# Patient Record
Sex: FEMALE | Race: BLACK OR AFRICAN AMERICAN | NOT HISPANIC OR LATINO | Employment: UNEMPLOYED
[De-identification: names, ages, dates, MRNs, and addresses within clinical notes are randomized per-mention and may not be internally consistent; named-entity substitution may affect disease eponyms.]

---

## 2017-06-17 ENCOUNTER — HEALTH MAINTENANCE LETTER (OUTPATIENT)
Age: 22
End: 2017-06-17

## 2018-11-17 ENCOUNTER — HEALTH MAINTENANCE LETTER (OUTPATIENT)
Age: 23
End: 2018-11-17

## 2020-08-27 ENCOUNTER — OFFICE VISIT (OUTPATIENT)
Dept: URGENT CARE | Facility: URGENT CARE | Age: 25
End: 2020-08-27
Payer: COMMERCIAL

## 2020-08-27 VITALS
WEIGHT: 205 LBS | TEMPERATURE: 98 F | HEART RATE: 68 BPM | SYSTOLIC BLOOD PRESSURE: 141 MMHG | BODY MASS INDEX: 38.73 KG/M2 | OXYGEN SATURATION: 100 % | DIASTOLIC BLOOD PRESSURE: 88 MMHG

## 2020-08-27 DIAGNOSIS — N89.8 VAGINAL DISCHARGE: ICD-10-CM

## 2020-08-27 DIAGNOSIS — N89.8 VAGINAL ITCHING: ICD-10-CM

## 2020-08-27 DIAGNOSIS — R30.0 DYSURIA: ICD-10-CM

## 2020-08-27 DIAGNOSIS — B96.89 BACTERIAL VAGINITIS: Primary | ICD-10-CM

## 2020-08-27 DIAGNOSIS — N76.0 BACTERIAL VAGINITIS: Primary | ICD-10-CM

## 2020-08-27 LAB
ALBUMIN UR-MCNC: NEGATIVE MG/DL
APPEARANCE UR: CLEAR
BACTERIA #/AREA URNS HPF: ABNORMAL /HPF
BILIRUB UR QL STRIP: NEGATIVE
COLOR UR AUTO: YELLOW
GLUCOSE UR STRIP-MCNC: NEGATIVE MG/DL
HGB UR QL STRIP: NEGATIVE
KETONES UR STRIP-MCNC: NEGATIVE MG/DL
LEUKOCYTE ESTERASE UR QL STRIP: ABNORMAL
NITRATE UR QL: NEGATIVE
NON-SQ EPI CELLS #/AREA URNS LPF: ABNORMAL /LPF
PH UR STRIP: 7 PH (ref 5–7)
RBC #/AREA URNS AUTO: ABNORMAL /HPF
SOURCE: ABNORMAL
SP GR UR STRIP: 1.01 (ref 1–1.03)
SPECIMEN SOURCE: ABNORMAL
UROBILINOGEN UR STRIP-ACNC: 0.2 EU/DL (ref 0.2–1)
WBC #/AREA URNS AUTO: ABNORMAL /HPF
WET PREP SPEC: ABNORMAL

## 2020-08-27 PROCEDURE — 81001 URINALYSIS AUTO W/SCOPE: CPT | Performed by: NURSE PRACTITIONER

## 2020-08-27 PROCEDURE — 87210 SMEAR WET MOUNT SALINE/INK: CPT | Performed by: NURSE PRACTITIONER

## 2020-08-27 PROCEDURE — 99203 OFFICE O/P NEW LOW 30 MIN: CPT | Performed by: NURSE PRACTITIONER

## 2020-08-27 PROCEDURE — 87591 N.GONORRHOEAE DNA AMP PROB: CPT | Performed by: NURSE PRACTITIONER

## 2020-08-27 PROCEDURE — 87491 CHLMYD TRACH DNA AMP PROBE: CPT | Performed by: NURSE PRACTITIONER

## 2020-08-27 RX ORDER — METRONIDAZOLE 500 MG/1
500 TABLET ORAL 2 TIMES DAILY
Qty: 14 TABLET | Refills: 0 | Status: SHIPPED | OUTPATIENT
Start: 2020-08-27 | End: 2020-09-03

## 2020-08-27 ASSESSMENT — PAIN SCALES - GENERAL: PAINLEVEL: MODERATE PAIN (4)

## 2020-08-27 NOTE — PROGRESS NOTES
SUBJECTIVE:   Jeannine Oakley is a 24 year old female presenting with a chief complaint of vaginal itching and discharge with odor for 2 1/2 weeks.  She has had one new sexual partner and has had chlamydia in the past.    Onset of symptoms was 2 week(s) ago.  Course of illness is worsening.    Severity moderate  Previous Treatment none      Past Medical History:   Diagnosis Date     Murmur, heart     as an infant     Current Outpatient Medications   Medication Sig Dispense Refill     metroNIDAZOLE (FLAGYL) 500 MG tablet Take 1 tablet (500 mg) by mouth 2 times daily for 7 days 14 tablet 0     UNKNOWN TO PATIENT Birth control       Social History     Tobacco Use     Smoking status: Never Smoker     Smokeless tobacco: Never Used   Substance Use Topics     Alcohol use: No     Alcohol/week: 0.0 standard drinks     History reviewed. No pertinent family history.     ROS: 10 point ROS neg other than the symptoms noted above in the HPI.     OBJECTIVE  BP (!) 141/88   Pulse 68   Temp 98  F (36.7  C) (Tympanic)   Wt 93 kg (205 lb)   SpO2 100%   Breastfeeding No   BMI 38.73 kg/m        GENERAL APPEARANCE: healthy appearing, alert     EYES: PERRL, EOMI, sclera non-icteric     HENT: oral exam benign, mucus membranes intact, without ulcers or lesions     NECK: no adenopathy or asymmetry, thyroid normal to palpation     RESP: lungs clear to auscultation - no rales, rhonchi or wheezes     CV: regular rates and rhythm, no murmurs, rubs, or gallop     ABDOMEN:  soft, nontender, no HSM or masses and bowel sounds normal     : Normal adnexae, and uterus without masses, thick white discharge is present in the vagina and coming from the cervical os     MS: extremities normal- no gross deformities noted; normal muscle tone.     SKIN: no suspicious lesions or rashes     NEURO: Normal strength and tone, mentation intact and speech normal     PSYCH: normal thought process; no significant mood disturbance      Labs:  Results  for orders placed or performed in visit on 08/27/20 (from the past 24 hour(s))   *UA reflex to Microscopic and Culture ([facility] and [facility] (except [place] and [place])    Specimen: Midstream Urine   Result Value Ref Range    Color Urine Yellow     Appearance Urine Clear     Glucose Urine Negative NEG^Negative mg/dL    Bilirubin Urine Negative NEG^Negative    Ketones Urine Negative NEG^Negative mg/dL    Specific Gravity Urine 1.015 1.003 - 1.035    Blood Urine Negative NEG^Negative    pH Urine 7.0 5.0 - 7.0 pH    Protein Albumin Urine Negative NEG^Negative mg/dL    Urobilinogen Urine 0.2 0.2 - 1.0 EU/dL    Nitrite Urine Negative NEG^Negative    Leukocyte Esterase Urine Small (A) NEG^Negative    Source Midstream Urine    Urine Microscopic   Result Value Ref Range    WBC Urine 5-10 (A) OTO5^0 - 5 /HPF    RBC Urine O - 2 OTO2^O - 2 /HPF    Squamous Epithelial /LPF Urine Many (A) FEW^Few /LPF    Bacteria Urine Moderate (A) NEG^Negative /HPF   Wet prep    Specimen: Vagina   Result Value Ref Range    Specimen Description Vagina     Wet Prep No Trichomonas seen     Wet Prep Moderate  Clue cells seen   (A)     Wet Prep No yeast seen     Wet Prep Few  WBC'S seen            ASSESSMENT/PLAN:      ICD-10-CM    1. Bacterial vaginitis  N76.0 metroNIDAZOLE (FLAGYL) 500 MG tablet    B96.89    2. Dysuria  R30.0 Wet prep     *UA reflex to Microscopic and Culture ([facility] (except [place] and [place])     Urine Microscopic   3. Vaginal itching  N89.8 NEISSERIA GONORRHOEA PCR     CHLAMYDIA TRACHOMATIS PCR   4. Vaginal discharge  N89.8 NEISSERIA GONORRHOEA PCR     CHLAMYDIA TRACHOMATIS PCR        Follow Up:      Patient Instructions     Patient Education     Bacterial Vaginosis    You have a vaginal infection called bacterial vaginosis (BV). Both good and bad bacteria are present in a healthy vagina. BV occurs when these bacteria get out of balance. The number of bad bacteria increase. And the number of  good bacteria decrease. Although BV is associated with sexual activity, it is not a sexually transmitted disease.  BV may or may not cause symptoms. If symptoms do occur, they can include:    Thin, gray, milky-white, or sometimes green discharge    Unpleasant odor or  fishy  smell    Itching, burning, or pain in or around the vagina  It is not known what causes BV, but certain factors can make the problem more likely. This can include:    Douching    Having sex with a new partner    Having sex with more than one partner  BV will sometimes go away on its own. But treatment is usually recommended. This is because untreated BV can increase the risk of more serious health problems such as:    Pelvic inflammatory disease (PID)     delivery (giving birth to a baby early if you re pregnant)    HIV and certain other sexually transmitted diseases (STDs)    Infection after surgery on the reproductive organs  Home care  General care    BV is most often treated with medicines called antibiotics. These may be given as pills or as a vaginal cream. If antibiotics are prescribed, be sure to use them exactly as directed. Also, be sure to complete all of the medicine, even if your symptoms go away.    Don't douche or having sex during treatment.    If you have sex with a female partner, ask your healthcare provider if she should also be treated.  Prevention    Don't douche.    Don't have sex. If you do have sex, then take steps to lower your risk:  ? Use condoms when having sex.  ? Limit the number of sexual partners you have.  Follow-up care  Follow up with your healthcare provider, or as advised.  When to seek medical advice  Call your healthcare provider right away if:    You have a fever of 100.4 F (38 C) or higher, or as directed by your provider.    Your symptoms worsen, or they don t go away within a few days of starting treatment.    You have new pain in the lower belly or pelvic region.    You have side effects that  bother you or a reaction to the pills or cream you re prescribed.    You or any partners you have sex with have new symptoms, such as a rash, joint pain, or sores.  Date Last Reviewed: 10/1/2017    0112-4228 [employer]. [address] [place] [address]. All rights reserved. This information is not intended as a substitute for professional medical care. Always follow your healthcare professional's instructions.               NATALIA Myers, CNP  [facility] Provider

## 2020-08-27 NOTE — PATIENT INSTRUCTIONS

## 2020-08-27 NOTE — LETTER
August 28, 2020      Jeannine Chung Cele  [address] [place] [address]        Dear ,    We are writing to inform you of your recent test results.     Your test results fall within the expected ranges and results were negative for chlamydia and gonorrhea.    Please continue with current treatment plan. Enclosed is a copy of these results.    If you have any questions or concerns, please call the clinic at the number listed above.   Thank you for choosing [facility].        Sincerely,        Caity Raines, CNP      Resulted Orders   Wet prep   Result Value Ref Range    Specimen Description Vagina     Wet Prep No Trichomonas seen     Wet Prep Moderate  Clue cells seen   (A)     Wet Prep No yeast seen     Wet Prep Few  WBC'S seen      *UA reflex to Microscopic and Culture ([place] [facility] (except [place] and [place])   Result Value Ref Range    Color Urine Yellow     Appearance Urine Clear     Glucose Urine Negative NEG^Negative mg/dL    Bilirubin Urine Negative NEG^Negative    Ketones Urine Negative NEG^Negative mg/dL    Specific Gravity Urine 1.015 1.003 - 1.035    Blood Urine Negative NEG^Negative    pH Urine 7.0 5.0 - 7.0 pH    Protein Albumin Urine Negative NEG^Negative mg/dL    Urobilinogen Urine 0.2 0.2 - 1.0 EU/dL    Nitrite Urine Negative NEG^Negative    Leukocyte Esterase Urine Small (A) NEG^Negative    Source Midstream Urine    Urine Microscopic   Result Value Ref Range    WBC Urine 5-10 (A) OTO5^0 - 5 /HPF    RBC Urine O - 2 OTO2^O - 2 /HPF    Squamous Epithelial /LPF Urine Many (A) FEW^Few /LPF    Bacteria Urine Moderate (A) NEG^Negative /HPF   NEISSERIA GONORRHOEA PCR   Result Value Ref Range    Specimen Descrip Endocervical     N Gonorrhea PCR Negative NEG^Negative      Comment:      Negative for N. gonorrhoeae rRNA by transcription mediated amplification.  A negative result by transcription mediated amplification does not preclude   the presence of N.  gonorrhoeae infection because results are dependent on   proper and adequate collection, absence of inhibitors, and sufficient rRNA to   be detected.     CHLAMYDIA TRACHOMATIS PCR   Result Value Ref Range    Specimen Description Endocervical     Chlamydia Trachomatis PCR Negative NEG^Negative      Comment:      Negative for C. trachomatis rRNA by transcription mediated amplification.  A negative result by transcription mediated amplification does not preclude   the presence of C. trachomatis infection because results are dependent on   proper and adequate collection, absence of inhibitors, and sufficient rRNA to   be detected.

## 2020-08-28 LAB
C TRACH DNA SPEC QL NAA+PROBE: NEGATIVE
N GONORRHOEA DNA SPEC QL NAA+PROBE: NEGATIVE
SPECIMEN SOURCE: NORMAL
SPECIMEN SOURCE: NORMAL

## 2021-02-23 ENCOUNTER — ANCILLARY PROCEDURE (OUTPATIENT)
Dept: GENERAL RADIOLOGY | Facility: CLINIC | Age: 26
End: 2021-02-23
Attending: FAMILY MEDICINE
Payer: COMMERCIAL

## 2021-02-23 ENCOUNTER — OFFICE VISIT (OUTPATIENT)
Dept: ORTHOPEDICS | Facility: CLINIC | Age: 26
End: 2021-02-23
Payer: COMMERCIAL

## 2021-02-23 VITALS — WEIGHT: 205 LBS | HEIGHT: 60 IN | BODY MASS INDEX: 40.25 KG/M2

## 2021-02-23 DIAGNOSIS — M25.572 ACUTE LEFT ANKLE PAIN: ICD-10-CM

## 2021-02-23 DIAGNOSIS — S82.891A CLOSED AVULSION FRACTURE OF RIGHT ANKLE, INITIAL ENCOUNTER: ICD-10-CM

## 2021-02-23 DIAGNOSIS — M25.572 ACUTE LEFT ANKLE PAIN: Primary | ICD-10-CM

## 2021-02-23 DIAGNOSIS — E66.01 MORBID OBESITY (H): ICD-10-CM

## 2021-02-23 PROCEDURE — 99204 OFFICE O/P NEW MOD 45 MIN: CPT | Performed by: FAMILY MEDICINE

## 2021-02-23 PROCEDURE — 73610 X-RAY EXAM OF ANKLE: CPT | Mod: LT | Performed by: RADIOLOGY

## 2021-02-23 ASSESSMENT — MIFFLIN-ST. JEOR: SCORE: 1606.37

## 2021-02-23 NOTE — PROGRESS NOTES
SPORTS & ORTHOPEDIC WALK-IN VISIT 2/23/2021    Primary Care Physician:      2/23/21 - slipped on patch of ice at work. Mentions eversion [employer].  Felt a pop.  Has history of ankle sprains during basketball as a youth.    Most of the pain is located on lateral malleolus.     Has had a chance to ice, rest, take tylenol.    Reason for visit:     What part of your body is injured / painful?  left ankle    What caused the injury /pain? Slip at work    How long ago did your injury occur or pain begin? today    What are your most bothersome symptoms? Pain and Swelling    How would you characterize your symptom?  aching and sharp    What makes your symptoms better? Rest, Ice and Tylenol    What makes your symptoms worse? Standing, Walking and Movement    Have you been previously seen for this problem? No    Medical History:    Any recent changes to your medical history? No    Any new medication prescribed since last visit? No    Have you had surgery on this body part before? 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 No    Do you have any vision problems? No    Do you have any chest pain or edema? No    Do you have any shortness of breath or wheezing?  No    Do you have stomach problems? No    Do you have any numbness or focal weakness? No    Do you have diabetes? No    Do you have problems with bleeding or clotting? No    Do you have an rashes or other skin lesions? No

## 2021-02-23 NOTE — LETTER
February 23, 2021      Jeannine Oakley  [address]  [place] [address]        To Whom It May Concern:    Jeannine Oakley was seen in our clinic. She suffered an injury to her ankle on site of employment.  She will be off of work until 3/1/21. Starting on 3/1/21 she will be limited to light duty activity. No frequent standing and walking.  Sitting tasks only.  Please make necessary accommodations which will continue until follow up in 4 weeks. 3/23/21.  Thanks.    Sincerely,        Kendall Cosby,

## 2021-02-23 NOTE — LETTER
2/23/2021         RE: Jeannine Oakley  [address]  [place] [address]        Dear Colleague,    Thank you for referring your patient, Jeannine Oakley, to the [facility] [place]. Please see a copy of my visit note below.          SPORTS & ORTHOPEDIC WALK-IN VISIT 2/23/2021    Primary Care Physician:      2/23/21 - slipped on patch of ice at work. Mentions [employer].  Felt a pop.  Has history of ankle sprains during basketball as a youth.    Most of the pain is located on lateral malleolus.     Has had a chance to ice, rest, take tylenol.    Reason for visit:     What part of your body is injured / painful?  left ankle    What caused the injury /pain? Slip at work    How long ago did your injury occur or pain begin? today    What are your most bothersome symptoms? Pain and Swelling    How would you characterize your symptom?  aching and sharp    What makes your symptoms better? Rest, Ice and Tylenol    What makes your symptoms worse? Standing, Walking and Movement    Have you been previously seen for this problem? No    Medical History:    Any recent changes to your medical history? No    Any new medication prescribed since last visit? No    Have you had surgery on this body part before? 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 No    Do you have any vision problems? No    Do you have any chest pain or edema? No    Do you have any shortness of breath or wheezing?  No    Do you have stomach problems? No    Do you have any numbness or focal weakness? No    Do you have diabetes? No    Do you have problems with bleeding or clotting? No    Do you have an rashes or other skin lesions? No           CHIEF COMPLAINT:  Pain of the Right Ankle       HISTORY OF PRESENT ILLNESS  Ms. Oakley is a pleasant 25 year old year old female  "who presents to clinic today with right ankle pain. Jeannine explains that right ankle pain began acutely this morning.  She slipped on a patch of ice while at work, inverting her ankle. Felt a \"pop\".      What part of your body is injured / painful?  left ankle    What caused the injury /pain? Slip at work    How long ago did your injury occur or pain begin? today    What are your most bothersome symptoms? Pain and Swelling    How would you characterize your symptom?  aching and sharp    What makes your symptoms better? Rest, Ice and Tylenol    What makes your symptoms worse? Standing, Walking and Movement    Have you been previously seen for this problem? No    Additional history: as documented    MEDICAL HISTORY  There is no problem list on file for this patient.      Current Outpatient Medications   Medication Sig Dispense Refill     UNKNOWN TO PATIENT Birth control         No Known Allergies    No family history on file.    Additional medical/Social/Surgical histories reviewed in [employer] and updated as appropriate.     REVIEW OF SYSTEMS (2/23/2021)  A 10-point review of systems was obtained and is negative except for as noted in the HPI.      PHYSICAL EXAM  Ht 1.524 m (5')   Wt 93 kg (205 lb)   BMI 40.04 kg/m      General  - normal appearance, in no obvious distress  HEENT  - conjunctivae not injected, moist mucous membranes  CV  - normal pulses at posterior tib and dorsalis pedis  Pulm  - normal respiratory pattern, non-labored  Musculoskeletal - ankle  - stance: gait favors affected side, reluctant to bear weight  - inspection: moderate swelling laterally, normal bone and joint alignment, no obvious deformity  - palpation: tenderness over ATFL TIP OF LATERAL MALLEOLUS, no tenderness over medial malleoli, navicular, or base of 5th MT  - ROM: intact globally but limited secondary to pain  - strength: 4/5 in all planes  - special tests:  pain with inversion stress  (-) anterior drawer  (-) talar tilt  (-) " Tinel's  (-) squeeze test  (-) Galvan test  Neuro  - no sensory or motor deficit, grossly normal coordination, normal muscle tone  Skin  - ecchymosis overlying lateral foot-ankle junction, no warmth or induration, no obvious rash  Psych  - interactive, appropriate, normal mood and affect    IMAGING :XR ankle left 3V. Final results and radiologist's interpretation, available in the [facility] health record. Images were reviewed with the patient/family members in the office today. My personal interpretation of the performed imaging is avulsion of lateral malleolus.     ASSESSMENT & PLAN  Ms. Oakley is a 25 year old year old female who presents to clinic today with acute pain and swelling of left ankle after inversion injury today. Discussed small avulsion fracture nature and that it will heal much like an ankle sprain over time.    Diagnosis:   Avulsion fracture of lateral malleolus.    Discussed wearing a CAM boot tall  Crutches and may WBAT  Elevation, ibuprofen/tylenol reviewed  Work note provided  Follow up 4 weeks    It was a pleasure seeing Jeannine today.    Kendall Cosby DO, CAQSM  Primary Care Sports Medicine

## 2021-02-23 NOTE — PROGRESS NOTES
"CHIEF COMPLAINT:  Pain of the Right Ankle       HISTORY OF PRESENT ILLNESS  Ms. Oakley is a pleasant 25 year old year old female who presents to clinic today with right ankle pain. Jeannine explains that right ankle pain began acutely this morning.  She slipped on a patch of ice while at work, inverting her ankle. Felt a \"pop\".      What part of your body is injured / painful?  left ankle    What caused the injury /pain? Slip at work    How long ago did your injury occur or pain begin? today    What are your most bothersome symptoms? Pain and Swelling    How would you characterize your symptom?  aching and sharp    What makes your symptoms better? Rest, Ice and Tylenol    What makes your symptoms worse? Standing, Walking and Movement    Have you been previously seen for this problem? No    Additional history: as documented    MEDICAL HISTORY  There is no problem list on file for this patient.      Current Outpatient Medications   Medication Sig Dispense Refill     UNKNOWN TO PATIENT Birth control         No Known Allergies    No family history on file.    Additional medical/Social/Surgical histories reviewed in [employer] and updated as appropriate.     REVIEW OF SYSTEMS (2/23/2021)  A 10-point review of systems was obtained and is negative except for as noted in the HPI.      PHYSICAL EXAM  Ht 1.524 m (5')   Wt 93 kg (205 lb)   BMI 40.04 kg/m      General  - normal appearance, in no obvious distress  HEENT  - conjunctivae not injected, moist mucous membranes  CV  - normal pulses at posterior tib and dorsalis pedis  Pulm  - normal respiratory pattern, non-labored  Musculoskeletal - ankle  - stance: gait favors affected side, reluctant to bear weight  - inspection: moderate swelling laterally, normal bone and joint alignment, no obvious deformity  - palpation: tenderness over ATFL TIP OF LATERAL MALLEOLUS, no tenderness over medial malleoli, navicular, or base of 5th MT  - ROM: intact globally but limited secondary to " pain  - strength: 4/5 in all planes  - special tests:  pain with inversion stress  (-) anterior drawer  (-) talar tilt  (-) Tinel's  (-) squeeze test  (-) Galvan test  Neuro  - no sensory or motor deficit, grossly normal coordination, normal muscle tone  Skin  - ecchymosis overlying lateral foot-ankle junction, no warmth or induration, no obvious rash  Psych  - interactive, appropriate, normal mood and affect    IMAGING :XR ankle left 3V. Final results and radiologist's interpretation, available in the [facility] health record. Images were reviewed with the patient/family members in the office today. My personal interpretation of the performed imaging is avulsion of lateral malleolus.     ASSESSMENT & PLAN  Ms. Oakley is a 25 year old year old female who presents to clinic today with acute pain and swelling of left ankle after inversion injury today. Discussed small avulsion fracture nature and that it will heal much like an ankle sprain over time.    Diagnosis:   Avulsion fracture of lateral malleolus.    Discussed wearing a CAM boot tall  Crutches and may WBAT  Elevation, ibuprofen/tylenol reviewed  Work note provided  Follow up 4 weeks    It was a pleasure seeing Jeannine today.    Kendall Cosby DO, CAQSM  Primary Care Sports Medicine

## 2021-03-27 ENCOUNTER — OFFICE VISIT (OUTPATIENT)
Dept: ORTHOPEDICS | Facility: CLINIC | Age: 26
End: 2021-03-27
Payer: COMMERCIAL

## 2021-03-27 VITALS — BODY MASS INDEX: 40.25 KG/M2 | HEIGHT: 60 IN | WEIGHT: 205 LBS

## 2021-03-27 DIAGNOSIS — M25.572 ACUTE LEFT ANKLE PAIN: ICD-10-CM

## 2021-03-27 DIAGNOSIS — S82.891D CLOSED AVULSION FRACTURE OF RIGHT ANKLE WITH ROUTINE HEALING, SUBSEQUENT ENCOUNTER: Primary | ICD-10-CM

## 2021-03-27 PROCEDURE — 99213 OFFICE O/P EST LOW 20 MIN: CPT | Performed by: FAMILY MEDICINE

## 2021-03-27 ASSESSMENT — MIFFLIN-ST. JEOR: SCORE: 1606.37

## 2021-03-27 NOTE — LETTER
3/27/2021         RE: Jeannine Oakley  [address]  [place] [address]        Dear Colleague,    Thank you for referring your patient, Jeannine Oakley, to the [facility]OP[facility] [place]. Please see a copy of my visit note below.    ESTABLISHED PATIENT FOLLOW-UP:  RECHECK (left ankle f/u)       HISTORY OF PRESENT ILLNESS  Ms. Oakley is a pleasant 25 year old year old female who presents to clinic today for follow-up of left ankle injury suffered on 2/23/2021    Date last seen: 2/23/2021  Following Therapeutic Plan: continuing with boot, decreased in swelling and pain. Pain: 4/10 with palpation  Function: Able to walk around the house ok but stairs are tough with or without the foot. Range of motion still feels limited but feels ankle is getting stronger.    Review of Systems:    Do you have fever, chills, weight loss? No    Do you have any vision problems? No    Do you have any chest pain or edema? No    Do you have any shortness of breath or wheezing?  No    Do you have stomach problems? No    Do you have any numbness or focal weakness? No    Do you have diabetes? No    Do you have problems with bleeding or clotting? No    Do you have an rashes or other skin lesions? No    MEDICAL HISTORY  Patient Active Problem List   Diagnosis     Morbid obesity (H)       Current Outpatient Medications   Medication Sig Dispense Refill     UNKNOWN TO PATIENT Birth control         No Known Allergies    No family history on file.    Additional medical/Social/Surgical histories reviewed in [facility] and updated as appropriate.       PHYSICAL EXAM  Ht 1.524 m (5')   Wt 93 kg (205 lb)   BMI 40.04 kg/m        General  - normal appearance, in no obvious distress  Musculoskeletal - ankle left  - stance: Normal gait and stance in boot  - inspection: Minimal swelling laterally, normal bone and joint alignment, no obvious deformity  - palpation: Mild remaining tenderness over ATFL TIP  OF LATERAL MALLEOLUS, no tenderness over medial malleoli, navicular, or base of 5th MT  - ROM: intact globally but limited secondary to pain  - strength: 4/5 in all planes  - special tests:  pain with inversion stress  (-) anterior drawer  Neuro  - no sensory or motor deficit, grossly normal coordination, normal muscle tone  Skin  -Resolved ecchymosis overlying lateral foot-ankle junction, no warmth or induration, no obvious rash  Psych  - interactive, appropriate, normal mood and affect    ASSESSMENT & PLAN  Ms. Oakley is a 25 year old year old female who presents to clinic today with RECHECK (left ankle f/u)    Diagnosis:  Avulsion fracture of left ankle  Sprain of left ankle    Jeannine has made strides towards recovery wearing the boot over the last 4 weeks.  At this time would like to transition into a lace up ankle brace.  I will implement a home exercise program for initial stretching and transition to strengthening.  She can remove ankle brace daily to perform his activities and if her ankle feels stronger in roughly 4 weeks she can discontinue the brace.  Follow-up as needed if strengthening and or range of motion remains painful in the next month.    It was a pleasure seeing Jeannine.    Kendall Cosby DO, CAM  Primary Care Sports Medicine

## 2021-03-27 NOTE — PROGRESS NOTES
ESTABLISHED PATIENT FOLLOW-UP:  RECHECK (left ankle f/u)       HISTORY OF PRESENT ILLNESS  Ms. Oakley is a pleasant 25 year old year old female who presents to clinic today for follow-up of left ankle injury suffered on 2/23/2021    Date last seen: 2/23/2021  Following Therapeutic Plan: continuing with boot, decreased in swelling and pain. Pain: 4/10 with palpation  Function: Able to walk around the house ok but stairs are tough with or without the foot. Range of motion still feels limited but feels ankle is getting stronger.    Review of Systems:    Do you have fever, chills, weight loss? No    Do you have any vision problems? No    Do you have any chest pain or edema? No    Do you have any shortness of breath or wheezing?  No    Do you have stomach problems? No    Do you have any numbness or focal weakness? No    Do you have diabetes? No    Do you have problems with bleeding or clotting? No    Do you have an rashes or other skin lesions? No    MEDICAL HISTORY  Patient Active Problem List   Diagnosis     Morbid obesity (H)       Current Outpatient Medications   Medication Sig Dispense Refill     UNKNOWN TO PATIENT Birth control         No Known Allergies    No family history on file.    Additional medical/Social/Surgical histories reviewed in [facility] and updated as appropriate.       PHYSICAL EXAM  Ht 1.524 m (5')   Wt 93 kg (205 lb)   BMI 40.04 kg/m        General  - normal appearance, in no obvious distress  Musculoskeletal - ankle left  - stance: Normal gait and stance in boot  - inspection: Minimal swelling laterally, normal bone and joint alignment, no obvious deformity  - palpation: Mild remaining tenderness over ATFL TIP OF LATERAL MALLEOLUS, no tenderness over medial malleoli, navicular, or base of 5th MT  - ROM: intact globally but limited secondary to pain  - strength: 4/5 in all planes  - special tests:  pain with inversion stress  (-) anterior drawer  Neuro  - no sensory or motor deficit, grossly normal  coordination, normal muscle tone  Skin  -Resolved ecchymosis overlying lateral foot-ankle junction, no warmth or induration, no obvious rash  Psych  - interactive, appropriate, normal mood and affect    ASSESSMENT & PLAN  Ms. Oakley is a 25 year old year old female who presents to clinic today with RECHECK (left ankle f/u)    Diagnosis:  Avulsion fracture of left ankle  Sprain of left ankle    Jeannine has made strides towards recovery wearing the boot over the last 4 weeks.  At this time would like to transition into a lace up ankle brace.  I will implement a home exercise program for initial stretching and transition to strengthening.  She can remove ankle brace daily to perform his activities and if her ankle feels stronger in roughly 4 weeks she can discontinue the brace.  Follow-up as needed if strengthening and or range of motion remains painful in the next month.    It was a pleasure seeing Jeannine.    Kendall Cosby DO, CAQSM  Primary Care Sports Medicine

## 2021-06-08 ENCOUNTER — OFFICE VISIT (OUTPATIENT)
Dept: URGENT CARE | Facility: URGENT CARE | Age: 26
End: 2021-06-08
Payer: COMMERCIAL

## 2021-06-08 VITALS
DIASTOLIC BLOOD PRESSURE: 90 MMHG | OXYGEN SATURATION: 100 % | SYSTOLIC BLOOD PRESSURE: 133 MMHG | BODY MASS INDEX: 40.7 KG/M2 | HEART RATE: 109 BPM | WEIGHT: 208.4 LBS | TEMPERATURE: 98.6 F

## 2021-06-08 DIAGNOSIS — H66.001 ACUTE SUPPURATIVE OTITIS MEDIA OF RIGHT EAR WITHOUT SPONTANEOUS RUPTURE OF TYMPANIC MEMBRANE, RECURRENCE NOT SPECIFIED: ICD-10-CM

## 2021-06-08 DIAGNOSIS — H60.391 INFECTIVE OTITIS EXTERNA, RIGHT: Primary | ICD-10-CM

## 2021-06-08 PROCEDURE — 99213 OFFICE O/P EST LOW 20 MIN: CPT | Performed by: PHYSICIAN ASSISTANT

## 2021-06-08 RX ORDER — AMOXICILLIN 875 MG
875 TABLET ORAL 2 TIMES DAILY
Qty: 20 TABLET | Refills: 0 | Status: SHIPPED | OUTPATIENT
Start: 2021-06-08 | End: 2021-06-18

## 2021-06-08 RX ORDER — NEOMYCIN SULFATE, POLYMYXIN B SULFATE AND HYDROCORTISONE 10; 3.5; 1 MG/ML; MG/ML; [USP'U]/ML
SUSPENSION/ DROPS AURICULAR (OTIC)
Qty: 10 ML | Refills: 0 | Status: SHIPPED | OUTPATIENT
Start: 2021-06-08 | End: 2021-11-08

## 2021-06-08 NOTE — PROGRESS NOTES
Chief Complaint   Patient presents with     Ear Problem     Right side ear pain. Started yesterday.             Differential Diagnosis:  URI Adult/Peds:  Acute right otitis media, Acute left otitis media, Otitis externa and TMJ, strep          ASSESSMENT:     ICD-10-CM    1. Infective otitis externa, right  H60.391 amoxicillin (AMOXIL) 875 MG tablet     neomycin-polymyxin-hydrocortisone (CORTISPORIN) 3.5-71225-6 otic suspension   2. Acute suppurative otitis media of right ear without spontaneous rupture of tympanic membrane, recurrence not specified  H66.001 amoxicillin (AMOXIL) 875 MG tablet     neomycin-polymyxin-hydrocortisone (CORTISPORIN) 3.5-90674-7 otic suspension             PLAN:Both otitis externa and otitis media. Oral antibiotic and antibiotic ear drops prescribed.  I have discussed clinical findings with patient.  Side effects of medications discussed.  Symptomatic care is discussed.  I have discussed the possibility of  worsening symptoms and indication to RTC or go to the ER if they occur.  All questions are answered, patient indicates understanding of these issues and is in agreement with plan.   Patient care instructions are discussed/given at the end of visit.   Lots of rest and fluids.      Jacque Ferguson PA-C      SUBJECTIVE:  25-year-old female presents for right ear pain since yesterday.  Some mild drainage.  No swimming.  No sore throat.  No cough.  No nasal congestion.  No fever.      No Known Allergies    Past Medical History:   Diagnosis Date     Murmur, heart     as an infant       UNKNOWN TO PATIENT, Birth control    No current facility-administered medications on file prior to visit.       Social History     Tobacco Use     Smoking status: Never Smoker     Smokeless tobacco: Never Used   Substance Use Topics     Alcohol use: No     Alcohol/week: 0.0 standard drinks       ROS:  CONSTITUTIONAL: Negative for fatigue or fever.  ENT: As above.  RESP: Negative for shortness of breath    MUSCULOSKELETAL:  Negative for significant muscle or joint pains.  NEUROLOGIC: Negative for headaches.  SKIN: Negative for rash.  PSYCH: Normal mentation for age.    OBJECTIVE:  BP (!) 133/90   Pulse 109   Temp 98.6  F (37  C) (Tympanic)   Wt 94.5 kg (208 lb 6.4 oz)   SpO2 100%   BMI 40.70 kg/m    GENERAL APPEARANCE: Healthy, alert and no distress.  EYES:Conjunctiva/sclera clear.  EARS: Left TM is translucent.  Right tragal tenderness present.  Ear canal is erythematous and inflamed.  TM is red.  resent right TM no cerumen.   Ear canals w/o erythema.  TM's intact w/o erythema.    NECK: Supple, nontender, no lymphadenopathy.  RESP: Breathing comfortably.  NEURO: Awake, alert    SKIN: No rashes        Jacque Ferguson PA-C

## 2021-06-20 ENCOUNTER — OFFICE VISIT (OUTPATIENT)
Dept: URGENT CARE | Facility: URGENT CARE | Age: 26
End: 2021-06-20
Payer: COMMERCIAL

## 2021-06-20 VITALS
RESPIRATION RATE: 16 BRPM | HEART RATE: 92 BPM | TEMPERATURE: 97.9 F | SYSTOLIC BLOOD PRESSURE: 137 MMHG | OXYGEN SATURATION: 100 % | DIASTOLIC BLOOD PRESSURE: 100 MMHG | BODY MASS INDEX: 40.82 KG/M2 | WEIGHT: 209 LBS

## 2021-06-20 DIAGNOSIS — R35.0 URINARY FREQUENCY: ICD-10-CM

## 2021-06-20 DIAGNOSIS — B37.31 YEAST INFECTION OF THE VAGINA: Primary | ICD-10-CM

## 2021-06-20 DIAGNOSIS — N94.9 VAGINAL DISCOMFORT: ICD-10-CM

## 2021-06-20 LAB
ALBUMIN UR-MCNC: ABNORMAL MG/DL
APPEARANCE UR: ABNORMAL
BACTERIA #/AREA URNS HPF: ABNORMAL /HPF
BILIRUB UR QL STRIP: NEGATIVE
COLOR UR AUTO: YELLOW
GLUCOSE UR STRIP-MCNC: NEGATIVE MG/DL
HGB UR QL STRIP: ABNORMAL
KETONES UR STRIP-MCNC: NEGATIVE MG/DL
LEUKOCYTE ESTERASE UR QL STRIP: ABNORMAL
NITRATE UR QL: NEGATIVE
NON-SQ EPI CELLS #/AREA URNS LPF: ABNORMAL /LPF
PH UR STRIP: 7 PH (ref 5–7)
RBC #/AREA URNS AUTO: ABNORMAL /HPF
SOURCE: ABNORMAL
SP GR UR STRIP: 1.01 (ref 1–1.03)
SPECIMEN SOURCE: ABNORMAL
UROBILINOGEN UR STRIP-ACNC: 1 EU/DL (ref 0.2–1)
WBC #/AREA URNS AUTO: ABNORMAL /HPF
WET PREP SPEC: ABNORMAL
YEAST #/AREA URNS HPF: ABNORMAL /HPF

## 2021-06-20 PROCEDURE — 87086 URINE CULTURE/COLONY COUNT: CPT | Performed by: FAMILY MEDICINE

## 2021-06-20 PROCEDURE — 99213 OFFICE O/P EST LOW 20 MIN: CPT | Performed by: FAMILY MEDICINE

## 2021-06-20 PROCEDURE — 81001 URINALYSIS AUTO W/SCOPE: CPT | Performed by: FAMILY MEDICINE

## 2021-06-20 PROCEDURE — 87210 SMEAR WET MOUNT SALINE/INK: CPT | Performed by: FAMILY MEDICINE

## 2021-06-20 RX ORDER — TIMOLOL MALEATE 5 MG/ML
SOLUTION/ DROPS OPHTHALMIC
COMMUNITY
Start: 2021-01-31 | End: 2021-11-08

## 2021-06-20 RX ORDER — FLUCONAZOLE 150 MG/1
150 TABLET ORAL DAILY
Qty: 3 TABLET | Refills: 0 | Status: SHIPPED | OUTPATIENT
Start: 2021-06-20 | End: 2021-06-23

## 2021-06-20 ASSESSMENT — PAIN SCALES - GENERAL: PAINLEVEL: NO PAIN (0)

## 2021-06-20 NOTE — PATIENT INSTRUCTIONS
Patient Education     Vaginal Infection: Yeast (Candidiasis)  Yeast infection occurs when yeast in the vagina increase and attacks the vaginal tissues. Yeast is a type of fungus. These infections are often caused by a type of yeast called Candida albicans. Other species of yeast can also cause infections. Factors that may make infection more likely include recent antibiotic use, douching, or increased sex. Yeast infections are more common in women who have diabetes, or are obese or pregnant, or have a weak immune system.   Symptoms of yeast infection    Clumpy or thin, white discharge, which may look like cottage cheese    No odor or minimal odor    Severe vaginal itching or burning    Burning with urination    Swelling, redness of vulva    Pain during sex    Treating yeast infection  Yeast infection is treated with a vaginal antifungal cream. In some cases, antifungal pills are prescribed instead. During treatment:     Finish all of your medicine, even if your symptoms go away.    Apply the cream before going to bed. Lie flat after applying so that it doesn't drip out.    Don't douche or use tampons.    Don't rely on a diaphragm or condoms, since the cream may weaken them.    Avoid intercourse if advised by your healthcare provider.  Should I treat a yeast infection myself?  Discuss with your healthcare provider whether you should use over-the-counter medicines to treat a yeast infection. Self-treatment may depend on whether:     You've had a yeast infection in the past.    You're at risk for sexually transmitted infections (STIs).  Call your healthcare provider if symptoms don't go away or come back after treatment.   [employer] last reviewed this educational content on 4/1/2020 2000-2021 [employer]. All rights reserved. This information is not intended as a substitute for professional medical care. Always follow your healthcare professional's instructions.

## 2021-06-20 NOTE — PROGRESS NOTES
Assessment & Plan     Yeast infection of the vagina  Wet prep findings consistent with vaginal candidiasis.  Diflucan prescribed  - fluconazole (DIFLUCAN) 150 MG tablet; Take 1 tablet (150 mg) by mouth daily for 3 days    Urinary frequency  UA findings reviewed.  Patient denies any significant bladder symptoms.  Urine culture sent for further evaluation  - *UA reflex to Microscopic and Culture ([place] and [facility] (except [place] and [place])  - Urine Microscopic  - Urine Culture Aerobic Bacterial    Vaginal discomfort  - Wet prep      Patient Instructions     Patient Education     Vaginal Infection: Yeast (Candidiasis)  Yeast infection occurs when yeast in the vagina increase and attacks the vaginal tissues. Yeast is a type of fungus. These infections are often caused by a type of yeast called Candida albicans. Other species of yeast can also cause infections. Factors that may make infection more likely include recent antibiotic use, douching, or increased sex. Yeast infections are more common in women who have diabetes, or are obese or pregnant, or have a weak immune system.   Symptoms of yeast infection    Clumpy or thin, white discharge, which may look like cottage cheese    No odor or minimal odor    Severe vaginal itching or burning    Burning with urination    Swelling, redness of vulva    Pain during sex    Treating yeast infection  Yeast infection is treated with a vaginal antifungal cream. In some cases, antifungal pills are prescribed instead. During treatment:     Finish all of your medicine, even if your symptoms go away.    Apply the cream before going to bed. Lie flat after applying so that it doesn't drip out.    Don't douche or use tampons.    Don't rely on a diaphragm or condoms, since the cream may weaken them.    Avoid intercourse if advised by your healthcare provider.  Should I treat a yeast infection myself?  Discuss with your healthcare provider whether you should use  over-the-counter medicines to treat a yeast infection. Self-treatment may depend on whether:     You've had a yeast infection in the past.    You're at risk for sexually transmitted infections (STIs).  Call your healthcare provider if symptoms don't go away or come back after treatment.   [employer] last reviewed this educational content on 4/1/2020 2000-2021 [employer]. All rights reserved. This information is not intended as a substitute for professional medical care. Always follow your healthcare professional's instructions.               César Okeefe MD  [facility] JOSE Paez is a 25 year old who presents for the following health issues    HPI     Concern -   Onset: 3 days   Description: Patient states that her vagina has been itching since thursday night 6/17 and is getting worse. Patient states that she is on her last dose of amoxicillian for ear infection- not seen on medication list.  Intensity: moderate  Progression of Symptoms:  worsening  Accompanying Signs & Symptoms: no bladder symptoms   Therapies tried and outcome: yeast kit just for 1 day        Review of Systems   Constitutional, HEENT, cardiovascular, pulmonary, gi and gu systems are negative, except as otherwise noted.      Objective    BP (!) 137/100   Pulse 92   Temp 97.9  F (36.6  C) (Tympanic)   Resp 16   Wt 94.8 kg (209 lb)   SpO2 100%   BMI 40.82 kg/m    Body mass index is 40.82 kg/m .  Physical Exam   GENERAL: alert and no distress  NECK: no adenopathy, no asymmetry, masses, or scars and thyroid normal to palpation  RESP: lungs clear to auscultation - no rales, rhonchi or wheezes  CV: regular rate and rhythm, normal S1 S2, no S3 or S4, no murmur, click or rub, no peripheral edema and peripheral pulses strong  ABDOMEN: soft, nontender, no hepatosplenomegaly, no masses and bowel sounds normal  MS: no gross musculoskeletal defects noted, no edema      Results for orders  placed or performed in visit on 06/20/21   *UA reflex to Microscopic and Culture ([place] and [facility] (except [place] and [place])     Status: Abnormal    Specimen: Midstream Urine   Result Value Ref Range    Color Urine Yellow     Appearance Urine Slightly Cloudy     Glucose Urine Negative NEG^Negative mg/dL    Bilirubin Urine Negative NEG^Negative    Ketones Urine Negative NEG^Negative mg/dL    Specific Gravity Urine 1.015 1.003 - 1.035    Blood Urine Moderate (A) NEG^Negative    pH Urine 7.0 5.0 - 7.0 pH    Protein Albumin Urine Trace (A) NEG^Negative mg/dL    Urobilinogen Urine 1.0 0.2 - 1.0 EU/dL    Nitrite Urine Negative NEG^Negative    Leukocyte Esterase Urine Moderate (A) NEG^Negative    Source Midstream Urine    Urine Microscopic     Status: Abnormal   Result Value Ref Range    WBC Urine 0 - 5 OTO5^0 - 5 /HPF    RBC Urine 2-5 (A) OTO2^O - 2 /HPF    Squamous Epithelial /LPF Urine Few FEW^Few /LPF    Bacteria Urine Many (A) NEG^Negative /HPF    Yeast Urine Moderate (A) NEG^Negative /HPF   Wet prep     Status: Abnormal    Specimen: Vagina   Result Value Ref Range    Specimen Description Vagina     Wet Prep No Trichomonas seen     Wet Prep No clue cells seen     Wet Prep Moderate  Yeast seen   (A)     Wet Prep Moderate  WBC'S seen

## 2021-06-21 LAB
BACTERIA SPEC CULT: NORMAL
Lab: NORMAL
SPECIMEN SOURCE: NORMAL

## 2021-07-10 ENCOUNTER — HEALTH MAINTENANCE LETTER (OUTPATIENT)
Age: 26
End: 2021-07-10

## 2021-09-04 ENCOUNTER — HEALTH MAINTENANCE LETTER (OUTPATIENT)
Age: 26
End: 2021-09-04

## 2021-11-08 ENCOUNTER — OFFICE VISIT (OUTPATIENT)
Dept: URGENT CARE | Facility: URGENT CARE | Age: 26
End: 2021-11-08
Payer: COMMERCIAL

## 2021-11-08 VITALS
BODY MASS INDEX: 39.33 KG/M2 | OXYGEN SATURATION: 98 % | DIASTOLIC BLOOD PRESSURE: 98 MMHG | WEIGHT: 201.4 LBS | HEART RATE: 81 BPM | TEMPERATURE: 98.1 F | SYSTOLIC BLOOD PRESSURE: 138 MMHG

## 2021-11-08 DIAGNOSIS — N89.8 VAGINAL ODOR: Primary | ICD-10-CM

## 2021-11-08 LAB
ALBUMIN UR-MCNC: NEGATIVE MG/DL
APPEARANCE UR: CLEAR
BACTERIA #/AREA URNS HPF: ABNORMAL /HPF
BILIRUB UR QL STRIP: NEGATIVE
CLUE CELLS: PRESENT
COLOR UR AUTO: YELLOW
GLUCOSE UR STRIP-MCNC: NEGATIVE MG/DL
HGB UR QL STRIP: ABNORMAL
KETONES UR STRIP-MCNC: NEGATIVE MG/DL
LEUKOCYTE ESTERASE UR QL STRIP: NEGATIVE
NITRATE UR QL: NEGATIVE
PH UR STRIP: 7 [PH] (ref 5–7)
RBC #/AREA URNS AUTO: ABNORMAL /HPF
SP GR UR STRIP: 1.01 (ref 1–1.03)
SQUAMOUS #/AREA URNS AUTO: ABNORMAL /LPF
TRICHOMONAS, WET PREP: ABNORMAL
UROBILINOGEN UR STRIP-ACNC: 1 E.U./DL
WBC #/AREA URNS AUTO: ABNORMAL /HPF
WBC'S/HIGH POWER FIELD, WET PREP: ABNORMAL
YEAST, WET PREP: ABNORMAL

## 2021-11-08 PROCEDURE — 87210 SMEAR WET MOUNT SALINE/INK: CPT | Performed by: NURSE PRACTITIONER

## 2021-11-08 PROCEDURE — 81001 URINALYSIS AUTO W/SCOPE: CPT | Performed by: NURSE PRACTITIONER

## 2021-11-08 PROCEDURE — 87491 CHLMYD TRACH DNA AMP PROBE: CPT | Performed by: NURSE PRACTITIONER

## 2021-11-08 PROCEDURE — 87591 N.GONORRHOEAE DNA AMP PROB: CPT | Performed by: NURSE PRACTITIONER

## 2021-11-08 PROCEDURE — 99214 OFFICE O/P EST MOD 30 MIN: CPT | Performed by: NURSE PRACTITIONER

## 2021-11-08 RX ORDER — METRONIDAZOLE 500 MG/1
500 TABLET ORAL 2 TIMES DAILY
Qty: 14 TABLET | Refills: 0 | Status: SHIPPED | OUTPATIENT
Start: 2021-11-08 | End: 2021-11-15

## 2021-11-09 NOTE — PATIENT INSTRUCTIONS
You have a bacterial infection called bacterial vaginosis/vagintis. This is not a sexually transmitted disease and your partner does not need to be treated.    Please take the medications as prescribed and do not have sexual intercourse until all the medication is gone.  Do not drink alcohol while taking this medications or you will become very sick.     This medication may interfere with birth control medications. While taking the antibiotic I would recommend using a second method of birth control.    Follow up if symptoms fail to improve or worsen.              Patient Education     Vaginal Infection: Bacterial Vaginosis  Both good and bad bacteria are present in a healthy vagina. Bacterial vaginosis (BV) occurs when these bacteria get out of balance. The numbers of good bacteria decrease. This allows the numbers of bad bacteria to increase and cause BV. In most cases, BV is not a serious problem.   Causes of bacterial vaginosis  The cause of BV is not clear. Douching may lead to it. Having sex with a new partner or more than 1 partner makes it more likely.   Symptoms of bacterial vaginosis  Symptoms of BV vary for each woman. Some women have few symptoms or none at all. If symptoms are present, they can include:     Thin, milky white or gray or sometimes green discharge    Unpleasant  fishy  odor    Irritation, itching, and burning at opening of vagina. This may mean it's caused by more than one type of bacteria.     Burning or irritation with sex or urination. This may mean it's caused by more than one type of bacteria.  Diagnosing bacterial vaginosis  Your healthcare provider will ask about your symptoms and health history. He or she will also do a pelvic exam. This is an exam of your vagina and cervix. A sample of vaginal fluid or discharge may be taken. This sample is checked for signs of BV.   Treating bacterial vaginosis  BV is often treated with antibiotics. They may be given in oral pill form or as a  vaginal cream. To use these medicines:     Be sure to take all of your medicine, even if your symptoms go away.    If you re taking antibiotic pills, don't drink alcohol until you re finished with all of your medicine.    If you re using vaginal cream, apply it as directed. Be aware that the cream may make condoms and diaphragms less effective.    Call your healthcare provider if symptoms dot go away within 4 days of starting treatment. Also call if you have a reaction to the medicine.  Why treatment matters  Even if you have no symptoms or your symptoms are mild, BV should be treated. Untreated BV can lead to health problems such as:     Increased risk for  delivery if you re pregnant    Increased risk for complications after surgery on the reproductive organs    Possible increased risk for pelvic inflammatory disease (PID)  [employer] last reviewed this educational content on 2020-2021 [employer]. All rights reserved. This information is not intended as a substitute for professional medical care. Always follow your healthcare professional's instructions.

## 2021-11-09 NOTE — PROGRESS NOTES
Chief Complaint   Patient presents with     Vaginal Problem         ICD-10-CM    1. Vaginal odor  N89.8 UA Macro with Reflex to Micro and Culture - lab collect     Wet prep - lab collect     UA Macro with Reflex to Micro and Culture - lab collect     Wet prep - lab collect     Chlamydia trachomatis/Neisseria gonorrhoeae by PCR - Clinic Collect     Urine Microscopic     metroNIDAZOLE (FLAGYL) 500 MG tablet   Patient will take metronidazole as prescribed.  Recommended no intercourse until treatment is complete and she has received results of gonorrhea and Chlamydia testing.    32 minutes total time spent reviewing patient's chart, completing history and physical exam, establishing plan of care, reviewing lab results and completing documentation.    Results for orders placed or performed in visit on 11/08/21 (from the past 24 hour(s))   UA Macro with Reflex to Micro and Culture - lab collect    Specimen: Urine, Midstream   Result Value Ref Range    Color Urine Yellow Colorless, Straw, Light Yellow, Yellow    Appearance Urine Clear Clear    Glucose Urine Negative Negative mg/dL    Bilirubin Urine Negative Negative    Ketones Urine Negative Negative mg/dL    Specific Gravity Urine 1.015 1.003 - 1.035    Blood Urine Trace (A) Negative    pH Urine 7.0 5.0 - 7.0    Protein Albumin Urine Negative Negative mg/dL    Urobilinogen Urine 1.0 0.2, 1.0 E.U./dL    Nitrite Urine Negative Negative    Leukocyte Esterase Urine Negative Negative   Wet prep - lab collect    Specimen: Vagina; Swab   Result Value Ref Range    Trichomonas Absent Absent    Yeast Absent Absent    Clue Cells Present (A) Absent    WBCs/high power field 1+ (A) None   Urine Microscopic   Result Value Ref Range    Bacteria Urine Moderate (A) None Seen /HPF    RBC Urine 0-2 0-2 /HPF /HPF    WBC Urine 0-5 0-5 /HPF /HPF    Squamous Epithelials Urine Moderate (A) None Seen /LPF    Narrative    Urine Culture not indicated       Subjective     Jeannine Oakley is  an 26 year old female who presents to clinic today for vaginal odor  And itching for over a week. She tried a 3 day Monistat kit with some relief of the odor, then she got her period and when she finished her period she noticed the smell again.    ROS: 10 point ROS neg other than the symptoms noted above in the HPI.       Objective    BP (!) 138/98   Pulse 81   Temp 98.1  F (36.7  C) (Tympanic)   Wt 91.4 kg (201 lb 6.4 oz)   SpO2 98%   Breastfeeding No   BMI 39.33 kg/m      Physical Exam       GENERAL APPEARANCE: healthy appearing, alert     ABDOMEN:  soft, nontender, no HSM or masses and bowel sounds normal, no CVA tenderness     MS: extremities normal- no gross deformities noted; normal muscle tone.     SKIN: no suspicious lesions or rashes     NEURO: Normal strength and tone, mentation intact and speech normal     PSYCH: normal thought process; no significant mood disturbance    Patient Instructions   You have a bacterial infection called bacterial vaginosis/vagintis. This is not a sexually transmitted disease and your partner does not need to be treated.    Please take the medications as prescribed and do not have sexual intercourse until all the medication is gone.  Do not drink alcohol while taking this medications or you will become very sick.     This medication may interfere with birth control medications. While taking the antibiotic I would recommend using a second method of birth control.    Follow up if symptoms fail to improve or worsen.              Patient Education     Vaginal Infection: Bacterial Vaginosis  Both good and bad bacteria are present in a healthy vagina. Bacterial vaginosis (BV) occurs when these bacteria get out of balance. The numbers of good bacteria decrease. This allows the numbers of bad bacteria to increase and cause BV. In most cases, BV is not a serious problem.   Causes of bacterial vaginosis  The cause of BV is not clear. Douching may lead to it. Having sex with a new  partner or more than 1 partner makes it more likely.   Symptoms of bacterial vaginosis  Symptoms of BV vary for each woman. Some women have few symptoms or none at all. If symptoms are present, they can include:     Thin, milky white or gray or sometimes green discharge    Unpleasant  fishy  odor    Irritation, itching, and burning at opening of vagina. This may mean it's caused by more than one type of bacteria.     Burning or irritation with sex or urination. This may mean it's caused by more than one type of bacteria.  Diagnosing bacterial vaginosis  Your healthcare provider will ask about your symptoms and health history. He or she will also do a pelvic exam. This is an exam of your vagina and cervix. A sample of vaginal fluid or discharge may be taken. This sample is checked for signs of BV.   Treating bacterial vaginosis  BV is often treated with antibiotics. They may be given in oral pill form or as a vaginal cream. To use these medicines:     Be sure to take all of your medicine, even if your symptoms go away.    If you re taking antibiotic pills, don't drink alcohol until you re finished with all of your medicine.    If you re using vaginal cream, apply it as directed. Be aware that the cream may make condoms and diaphragms less effective.    Call your healthcare provider if symptoms dot go away within 4 days of starting treatment. Also call if you have a reaction to the medicine.  Why treatment matters  Even if you have no symptoms or your symptoms are mild, BV should be treated. Untreated BV can lead to health problems such as:     Increased risk for  delivery if you re pregnant    Increased risk for complications after surgery on the reproductive organs    Possible increased risk for pelvic inflammatory disease (PID)  [employer] last reviewed this educational content on 2020-2021 [employer]. All rights reserved. This information is not intended as a substitute for  professional medical care. Always follow your healthcare professional's instructions.               NATALIA Myers, CNP  [facility] Provider

## 2021-11-10 LAB
C TRACH DNA SPEC QL PROBE+SIG AMP: NEGATIVE
N GONORRHOEA DNA SPEC QL NAA+PROBE: NEGATIVE

## 2022-08-06 ENCOUNTER — HEALTH MAINTENANCE LETTER (OUTPATIENT)
Age: 27
End: 2022-08-06

## 2022-10-22 ENCOUNTER — HEALTH MAINTENANCE LETTER (OUTPATIENT)
Age: 27
End: 2022-10-22

## 2023-01-06 ENCOUNTER — OFFICE VISIT (OUTPATIENT)
Dept: PEDIATRICS | Facility: CLINIC | Age: 28
End: 2023-01-06
Attending: PHYSICIAN ASSISTANT
Payer: COMMERCIAL

## 2023-01-06 ENCOUNTER — OFFICE VISIT (OUTPATIENT)
Dept: URGENT CARE | Facility: URGENT CARE | Age: 28
End: 2023-01-06
Payer: COMMERCIAL

## 2023-01-06 ENCOUNTER — ANCILLARY PROCEDURE (OUTPATIENT)
Dept: CT IMAGING | Facility: CLINIC | Age: 28
End: 2023-01-06
Attending: FAMILY MEDICINE
Payer: COMMERCIAL

## 2023-01-06 VITALS
SYSTOLIC BLOOD PRESSURE: 135 MMHG | TEMPERATURE: 98.3 F | HEART RATE: 80 BPM | WEIGHT: 221.4 LBS | BODY MASS INDEX: 43.24 KG/M2 | OXYGEN SATURATION: 99 % | DIASTOLIC BLOOD PRESSURE: 84 MMHG

## 2023-01-06 VITALS
TEMPERATURE: 98.8 F | DIASTOLIC BLOOD PRESSURE: 85 MMHG | SYSTOLIC BLOOD PRESSURE: 122 MMHG | HEART RATE: 72 BPM | OXYGEN SATURATION: 100 % | RESPIRATION RATE: 18 BRPM

## 2023-01-06 DIAGNOSIS — R10.33 ACUTE PERIUMBILICAL PAIN: Primary | ICD-10-CM

## 2023-01-06 DIAGNOSIS — R10.84 ABDOMINAL PAIN, GENERALIZED: ICD-10-CM

## 2023-01-06 DIAGNOSIS — R10.33 ACUTE PERIUMBILICAL PAIN: ICD-10-CM

## 2023-01-06 DIAGNOSIS — R93.5 ABNORMAL CT OF THE ABDOMEN: ICD-10-CM

## 2023-01-06 DIAGNOSIS — R10.84 ABDOMINAL PAIN, GENERALIZED: Primary | ICD-10-CM

## 2023-01-06 LAB
ALBUMIN SERPL-MCNC: 3.8 G/DL (ref 3.4–5)
ALBUMIN UR-MCNC: NEGATIVE MG/DL
ALP SERPL-CCNC: 109 U/L (ref 40–150)
ALT SERPL W P-5'-P-CCNC: 21 U/L (ref 0–50)
ANION GAP SERPL CALCULATED.3IONS-SCNC: 4 MMOL/L (ref 3–14)
APPEARANCE UR: CLEAR
AST SERPL W P-5'-P-CCNC: 14 U/L (ref 0–45)
BACTERIA #/AREA URNS HPF: ABNORMAL /HPF
BILIRUB SERPL-MCNC: 0.3 MG/DL (ref 0.2–1.3)
BILIRUB UR QL STRIP: NEGATIVE
BUN SERPL-MCNC: 10 MG/DL (ref 7–30)
CALCIUM SERPL-MCNC: 8.9 MG/DL (ref 8.5–10.1)
CHLORIDE BLD-SCNC: 105 MMOL/L (ref 94–109)
CO2 SERPL-SCNC: 29 MMOL/L (ref 20–32)
COLOR UR AUTO: ABNORMAL
CREAT SERPL-MCNC: 0.87 MG/DL (ref 0.52–1.04)
ERYTHROCYTE [DISTWIDTH] IN BLOOD BY AUTOMATED COUNT: 15.7 % (ref 10–15)
GFR SERPL CREATININE-BSD FRML MDRD: >90 ML/MIN/1.73M2
GLUCOSE BLD-MCNC: 93 MG/DL (ref 70–99)
GLUCOSE UR STRIP-MCNC: NEGATIVE MG/DL
HCG UR QL: NEGATIVE
HCT VFR BLD AUTO: 35.8 % (ref 35–47)
HGB BLD-MCNC: 11.2 G/DL (ref 11.7–15.7)
HGB UR QL STRIP: ABNORMAL
KETONES UR STRIP-MCNC: NEGATIVE MG/DL
LEUKOCYTE ESTERASE UR QL STRIP: NEGATIVE
LIPASE SERPL-CCNC: 173 U/L (ref 73–393)
MCH RBC QN AUTO: 25 PG (ref 26.5–33)
MCHC RBC AUTO-ENTMCNC: 31.3 G/DL (ref 31.5–36.5)
MCV RBC AUTO: 80 FL (ref 78–100)
NITRATE UR QL: NEGATIVE
PH UR STRIP: 6 [PH] (ref 5–7)
PLATELET # BLD AUTO: 270 10E3/UL (ref 150–450)
POTASSIUM BLD-SCNC: 3.9 MMOL/L (ref 3.4–5.3)
PROT SERPL-MCNC: 8.5 G/DL (ref 6.8–8.8)
RBC # BLD AUTO: 4.48 10E6/UL (ref 3.8–5.2)
RBC #/AREA URNS AUTO: ABNORMAL /HPF
SODIUM SERPL-SCNC: 138 MMOL/L (ref 133–144)
SP GR UR STRIP: 1.02 (ref 1–1.03)
SQUAMOUS #/AREA URNS AUTO: ABNORMAL /LPF
UROBILINOGEN UR STRIP-MCNC: NORMAL MG/DL
WBC # BLD AUTO: 9 10E3/UL (ref 4–11)
WBC #/AREA URNS AUTO: ABNORMAL /HPF

## 2023-01-06 PROCEDURE — 81025 URINE PREGNANCY TEST: CPT | Performed by: FAMILY MEDICINE

## 2023-01-06 PROCEDURE — 36415 COLL VENOUS BLD VENIPUNCTURE: CPT | Performed by: FAMILY MEDICINE

## 2023-01-06 PROCEDURE — 80053 COMPREHEN METABOLIC PANEL: CPT | Performed by: FAMILY MEDICINE

## 2023-01-06 PROCEDURE — 99215 OFFICE O/P EST HI 40 MIN: CPT | Performed by: FAMILY MEDICINE

## 2023-01-06 PROCEDURE — 83690 ASSAY OF LIPASE: CPT | Performed by: FAMILY MEDICINE

## 2023-01-06 PROCEDURE — 74177 CT ABD & PELVIS W/CONTRAST: CPT | Mod: GC | Performed by: STUDENT IN AN ORGANIZED HEALTH CARE EDUCATION/TRAINING PROGRAM

## 2023-01-06 PROCEDURE — 99207 REFERRAL TO ACUTE AND DIAGNOSTIC SERVICES: CPT | Performed by: PHYSICIAN ASSISTANT

## 2023-01-06 PROCEDURE — 85027 COMPLETE CBC AUTOMATED: CPT | Performed by: FAMILY MEDICINE

## 2023-01-06 PROCEDURE — 81001 URINALYSIS AUTO W/SCOPE: CPT | Performed by: FAMILY MEDICINE

## 2023-01-06 RX ORDER — IOPAMIDOL 755 MG/ML
135 INJECTION, SOLUTION INTRAVASCULAR ONCE
Status: COMPLETED | OUTPATIENT
Start: 2023-01-06 | End: 2023-01-06

## 2023-01-06 RX ORDER — AMLODIPINE BESYLATE 5 MG/1
5 TABLET ORAL
COMMUNITY
Start: 2021-06-24

## 2023-01-06 RX ADMIN — IOPAMIDOL 135 ML: 755 INJECTION, SOLUTION INTRAVASCULAR at 16:06

## 2023-01-06 ASSESSMENT — ENCOUNTER SYMPTOMS
MYALGIAS: 0
DIARRHEA: 0
VOMITING: 0
NAUSEA: 1
SORE THROAT: 0
RESPIRATORY NEGATIVE: 1
ABDOMINAL PAIN: 1
ARTHRALGIAS: 0
WOUND: 0
ALLERGIC/IMMUNOLOGIC NEGATIVE: 1
LIGHT-HEADEDNESS: 0
PALPITATIONS: 0
BACK PAIN: 0
RHINORRHEA: 0
SHORTNESS OF BREATH: 0
DYSURIA: 0
DIZZINESS: 0
JOINT SWELLING: 0
WEAKNESS: 0
HEADACHES: 0
MUSCULOSKELETAL NEGATIVE: 1
ENDOCRINE NEGATIVE: 1
FEVER: 0
EYES NEGATIVE: 1
CHILLS: 0
CARDIOVASCULAR NEGATIVE: 1
FREQUENCY: 0
NECK STIFFNESS: 0
COUGH: 0
NECK PAIN: 0

## 2023-01-06 ASSESSMENT — PAIN SCALES - GENERAL
PAINLEVEL: EXTREME PAIN (8)
PAINLEVEL: SEVERE PAIN (6)

## 2023-01-06 NOTE — PATIENT INSTRUCTIONS
Your CT shows a small hernia right at your bellybutton. It doesn't look like it is trapped but you do have a lot of pain so if the pain is getting worse again, I think going into the ER for re-examination makes sense over the weekend.     We also saw some 'fullness' in your ovaries - I don't think given where your pain is that this is the cause, but would like to have you come back on Monday to do an ultrasound of your ovaries which is a better test.     -Try taking ibuprofen and/or tylenol for the pain.       If you have any of the following over the weekend go into the ER:  -Fever >100.4  -Pain gets more severe and doesn't improve if you lay flat for a short time.   -Vomiting

## 2023-01-06 NOTE — PROGRESS NOTES
Chief Complaint:    Chief Complaint   Patient presents with     Abdominal Pain     Abdominal pain for two nights     Heartburn     Heartburn or indigestion. Burning sensation in chest. Onset- Wednesday night      Medical Decision Making:    Vital signs reviewed by Alverto Munoz PA-C  /84 (BP Location: Left arm, Patient Position: Sitting, Cuff Size: Adult Large)   Pulse 80   Temp 98.3  F (36.8  C) (Tympanic)   Wt 100.4 kg (221 lb 6.4 oz)   SpO2 99%   BMI 43.24 kg/m      Differential Diagnosis:  Abdominal Pain: Constipation, GB/Cholelithiasis, GERD/Ulcer, Appendix, Abdominal Wall, Hernia, Pancreatitis and Viral Gastroenteritis      ASSESSMENT:     1. Abdominal pain, generalized       PLAN:     Patient is in no acute distress. Vital signs are stable including afebrile. Patient reports two days of significant abdominal pain. Because of the location of pain, patients history of  and appendix being intact advised sending the patient for ADS evaluation to exclude differentials including appendicitis, hernia, pancreatitis. ADS staff notified and handoff completed with ADS provider.  Patient instructed to otherwise follow up with PCP in 1 week if symptoms have not improved.  Sooner if symptoms worsen.  Patient verbalized understanding and agreed with this plan.    Labs:     No results found for any visits on 23.    Current Meds:    Current Outpatient Medications:      amLODIPine (NORVASC) 5 MG tablet, Take 5 mg by mouth, Disp: , Rfl:     Allergies:  No Known Allergies    SUBJECTIVE    HPI: Jeannine Oakley is an 27 year old female with no signficant past medical history who presents for evaluation and treatment of intermittent periumbilical abdominal pain which started 2 night ago and has been worsening since then, peaking last night when she also had associated heartburn type symptoms. Endorses associated nausea. Reports the pain is always there slightly though at certain times gets  significantly worse. She rates the pain a 9/10. It is not affected by eating or drinking, bowel movements have been normal and do not seem to alter the pain. She has not tried anything at home for the pain. No fevers, chills, vomiting, significant chest pain or shortness of breath, appetite changes, urinary symptoms, vaginal symptoms, recent illness or other.     Denies recent travel or sickness exposure. Did not eating some marinated chicken a couple nights ago that she thought may have caused the pain but no one else who ate the chicken had this pain. She denies previous episodes of similar pain that has lasted this long. The patient did have a  3 years ago, no other surgeries. She still has her appendix. The patient no longer smokes, denies alcohol and drug use. LMP started 5 days ago and ended yesterday, reports no sexual activity since October. She has not tried anything for the pain.     ROS:      Review of Systems   Constitutional: Negative for chills and fever.   HENT: Negative for congestion, ear pain, rhinorrhea and sore throat.    Eyes: Negative.    Respiratory: Negative.  Negative for cough and shortness of breath.    Cardiovascular: Negative.  Negative for chest pain and palpitations.   Gastrointestinal: Positive for abdominal pain and nausea. Negative for diarrhea and vomiting.   Endocrine: Negative.    Genitourinary: Negative.  Negative for dysuria, frequency and vaginal discharge.   Musculoskeletal: Negative.  Negative for arthralgias, back pain, joint swelling, myalgias, neck pain and neck stiffness.   Skin: Negative.  Negative for rash and wound.   Allergic/Immunologic: Negative.  Negative for immunocompromised state.   Neurological: Negative for dizziness, weakness, light-headedness and headaches.        Family History   No family history on file.    Social History  Social History     Socioeconomic History     Marital status: Single     Spouse name: Not on file     Number of children: Not  on file     Years of education: Not on file     Highest education level: Not on file   Occupational History     Not on file   Tobacco Use     Smoking status: Never     Smokeless tobacco: Never   Substance and Sexual Activity     Alcohol use: No     Alcohol/week: 0.0 standard drinks     Drug use: No     Sexual activity: Yes     Partners: Male     Birth control/protection: Pill   Other Topics Concern     Parent/sibling w/ CABG, MI or angioplasty before 65F 55M? Not Asked   Social History Narrative     Not on file     Social Determinants of Health     Financial Resource Strain: Not on file   Food Insecurity: Not on file   Transportation Needs: Not on file   Physical Activity: Not on file   Stress: Not on file   Social Connections: Not on file   Intimate Partner Violence: Not on file   Housing Stability: Not on file        Surgical History:  No past surgical history on file.     Problem List:  Patient Active Problem List   Diagnosis     Morbid obesity (H)           OBJECTIVE:     Vital signs noted and reviewed by Alverto Munoz PA-C  /84 (BP Location: Left arm, Patient Position: Sitting, Cuff Size: Adult Large)   Pulse 80   Temp 98.3  F (36.8  C) (Tympanic)   Wt 100.4 kg (221 lb 6.4 oz)   SpO2 99%   BMI 43.24 kg/m       PEFR:    Physical Exam  Vitals and nursing note reviewed.   Constitutional:       General: She is not in acute distress.     Appearance: She is well-developed. She is not ill-appearing, toxic-appearing or diaphoretic.   HENT:      Head: Normocephalic and atraumatic.      Right Ear: Tympanic membrane and external ear normal. No drainage, swelling or tenderness. Tympanic membrane is not perforated, erythematous, retracted or bulging.      Left Ear: Tympanic membrane and external ear normal. No drainage, swelling or tenderness. Tympanic membrane is not perforated, erythematous, retracted or bulging.      Nose: No mucosal edema, congestion or rhinorrhea.      Right Sinus: No maxillary sinus  tenderness or frontal sinus tenderness.      Left Sinus: No maxillary sinus tenderness or frontal sinus tenderness.      Mouth/Throat:      Pharynx: No pharyngeal swelling, oropharyngeal exudate, posterior oropharyngeal erythema or uvula swelling.      Tonsils: No tonsillar abscesses.   Eyes:      Pupils: Pupils are equal, round, and reactive to light.   Neck:      Trachea: Trachea normal.   Cardiovascular:      Rate and Rhythm: Normal rate and regular rhythm.      Heart sounds: Normal heart sounds, S1 normal and S2 normal. No murmur heard.    No friction rub. No gallop.   Pulmonary:      Effort: Pulmonary effort is normal. No respiratory distress.      Breath sounds: Normal breath sounds. No decreased breath sounds, wheezing, rhonchi or rales.   Abdominal:      General: Bowel sounds are normal. There is no distension.      Palpations: Abdomen is soft. Abdomen is not rigid. There is no mass.      Tenderness: There is abdominal tenderness (periumbilical) in the right lower quadrant. There is no guarding or rebound. Negative signs include Gaines's sign, Rovsing's sign, McBurney's sign, psoas sign and obturator sign.      Hernia: No hernia is present.   Musculoskeletal:      Cervical back: Full passive range of motion without pain, normal range of motion and neck supple.   Lymphadenopathy:      Cervical: No cervical adenopathy.   Skin:     General: Skin is warm and dry.   Neurological:      Mental Status: She is alert and oriented to person, place, and time.      Cranial Nerves: No cranial nerve deficit.      Deep Tendon Reflexes: Reflexes are normal and symmetric.   Psychiatric:         Behavior: Behavior normal. Behavior is cooperative.         Thought Content: Thought content normal.         Judgment: Judgment normal.             Alverto Munoz PA-C  1/6/2023, 2:01 PM

## 2023-01-06 NOTE — PROGRESS NOTES
Assessment & Plan     Acute periumbilical pain and milder diffuse pain: Quite significant pain on palpation of the periumbilical region and a little throughout the lower abdomen. No fever, no elevated wbcs, normal CMP and lipase. Due to the significant pain, we did do a CT scan (after negative pregnancy test) with contrast and the results do show a small periumbilical hernia that is in the location of her pain and I do suspect may be causing the pain though it does not appear to be particularly inflamed on CT per the two residents that were available for interpretation. There is also some fullness in the ovaries, but her pain is not really in that area and ultrasound is not available and if this were a more concerning ovarian finding we would likely have some other findings on CT, so we will schedule her to have an ultrasound on Monday - I have ordered the ultrasound. Her pain went from a 9/10 to a 5/10 and she was able to eat here which were good signs, but I did discuss that if her pain continues to be severe or worsens or she has any fever/dizziness that she should not wait until Monday to be re-evaluated. She also has not tried any ibuprofen or tylenol and she will try that as well. If this is a more acutely developed hernia with recent wall tearing, she may benefit from ibuprofen and heat and time. Can address hernia if it continues to bother her.   - CT Abdomen Pelvis w Contrast; Future  - CBC with platelets; Future  - Comprehensive metabolic panel; Future  - UA with Microscopic reflex to Culture; Future  - HCG qualitative urine; Future  - Lipase; Future  - Lipase  - HCG qualitative urine  - UA with Microscopic reflex to Culture  - Comprehensive metabolic panel  - CBC with platelets  - Urine Microscopic  - sodium chloride (PF) 0.9% PF flush 3 mL    CT Abdomen:  RESIDENT PRELIMINARY INTERPRETATION  IMPRESSION:   1. Small fat-containing periumbilical hernia.  2. Normal appearance of the appendix.  3. Mildly  prominent appearance of the ovaries without gross  abnormality on CT could consider pelvic ultrasound if clinically  indicated       45 minutes spent on the date of the encounter doing chart review, review of test results, interpretation of tests, patient visit, documentation and discussion with other provider(s)      No follow-ups on file.    Ama Brito MD  [facility] HospitalCARMEN Paez is a 27 year old, presenting for the following health issues: abdominal pain  Abdominal Pain (Vicki-umbilical)      HPI Patient has had periumbilical abdominal pain for the past 2 days. She states last night it was at its worse and she was also experiencing heart burn with it but now the pain is really just around her bellybutton. She rates her pain 9/10.     She has had a , no other abdominal surgeries. Has her appendix and gallbladder.     Pain History:  When did you first notice your pain? - Acute Pain   Have you seen anyone else for your pain? Yes - Urgent Care  Where in your body do you have pain? Abdominal/Flank Pain  Onset/Duration: 2 days ago  Description:   Character: Sharp  Location: vicki-umbilical region  Radiation: None  Intensity: severe  Progression of Symptoms:  worsening  Accompanying Signs & Symptoms:  Fever/Chills: No  Gas/Bloating: YES  Nausea: YES  Vomitting: No  Diarrhea: No  Constipation: No  Dysuria or Hematuria: No  History:   Trauma: No  Previous similar pain: No  Previous tests done: none  Precipitating factors:   Does the pain change with:     Food: No    Bowel Movement: No    Urination: No   Other factors:  Yes - bumps/movements when driving caused worsening pain  Therapies tried and outcome: None  No LMP recorded. (Menstrual status: Birth Control).    Review of Systems   As above      Objective    BP (!) 141/95 (BP Location: Right arm, Patient Position: Sitting, Cuff Size: Adult Regular)   Pulse 84   Temp 98.8  F (37.1  C) (Oral)   Resp 18   SpO2  100%   There is no height or weight on file to calculate BMI.  Physical Exam   General: Uncomfortable appearing young woman but not in acute distress.  Abdomen: obese, mildly distended. She has quite substantial pain but without guarding on periumbilical palpation - no mass felt and no rebound tenderness. Milder pain in both right and left lower quadrants that radiates into the umbilicus. She is able to sit up/down without wincing and walk without difficulty.      Results for orders placed or performed in visit on 01/06/23   Lipase     Status: Normal   Result Value Ref Range    Lipase 173 73 - 393 U/L   HCG qualitative urine     Status: Normal   Result Value Ref Range    hCG Urine Qualitative Negative Negative   UA with Microscopic reflex to Culture     Status: Abnormal    Specimen: Urine, Midstream   Result Value Ref Range    Color Urine Light Yellow Colorless, Straw, Light Yellow, Yellow    Appearance Urine Clear Clear    Glucose Urine Negative Negative mg/dL    Bilirubin Urine Negative Negative    Ketones Urine Negative Negative mg/dL    Specific Gravity Urine 1.022 0.999 - 1.035    Blood Urine Trace (A) Negative    pH Urine 6.0 5.0 - 7.0    Protein Albumin Urine Negative Negative mg/dL    Nitrite Urine Negative Negative    Leukocyte Esterase Urine Negative Negative    Urobilinogen Urine Normal Normal, 2.0 mg/dL   Comprehensive metabolic panel     Status: Normal   Result Value Ref Range    Sodium 138 133 - 144 mmol/L    Potassium 3.9 3.4 - 5.3 mmol/L    Chloride 105 94 - 109 mmol/L    Carbon Dioxide (CO2) 29 20 - 32 mmol/L    Anion Gap 4 3 - 14 mmol/L    Urea Nitrogen 10 7 - 30 mg/dL    Creatinine 0.87 0.52 - 1.04 mg/dL    Calcium 8.9 8.5 - 10.1 mg/dL    Glucose 93 70 - 99 mg/dL    Alkaline Phosphatase 109 40 - 150 U/L    AST 14 0 - 45 U/L    ALT 21 0 - 50 U/L    Protein Total 8.5 6.8 - 8.8 g/dL    Albumin 3.8 3.4 - 5.0 g/dL    Bilirubin Total 0.3 0.2 - 1.3 mg/dL    GFR Estimate >90 >60 mL/min/1.73m2   CBC with  platelets     Status: Abnormal   Result Value Ref Range    WBC Count 9.0 4.0 - 11.0 10e3/uL    RBC Count 4.48 3.80 - 5.20 10e6/uL    Hemoglobin 11.2 (L) 11.7 - 15.7 g/dL    Hematocrit 35.8 35.0 - 47.0 %    MCV 80 78 - 100 fL    MCH 25.0 (L) 26.5 - 33.0 pg    MCHC 31.3 (L) 31.5 - 36.5 g/dL    RDW 15.7 (H) 10.0 - 15.0 %    Platelet Count 270 150 - 450 10e3/uL   Urine Microscopic     Status: Abnormal   Result Value Ref Range    Bacteria Urine Few (A) None Seen /HPF    RBC Urine 0-2 0-2 /HPF /HPF    WBC Urine None Seen 0-5 /HPF /HPF    Squamous Epithelials Urine Moderate (A) None Seen /LPF    Narrative    Urine Culture not indicated   Results for orders placed or performed in visit on 01/06/23   CT Abdomen Pelvis w Contrast     Status: None (Preliminary result)    Impression    RESIDENT PRELIMINARY INTERPRETATION  IMPRESSION:   1. Small fat-containing periumbilical hernia.  2. Normal appearance of the appendix.  3. Mildly prominent appearance of the ovaries without gross  abnormality on CT could consider pelvic ultrasound if clinically  indicated

## 2023-01-09 ENCOUNTER — OFFICE VISIT (OUTPATIENT)
Dept: PEDIATRICS | Facility: CLINIC | Age: 28
End: 2023-01-09
Payer: COMMERCIAL

## 2023-01-09 ENCOUNTER — ANCILLARY PROCEDURE (OUTPATIENT)
Dept: ULTRASOUND IMAGING | Facility: CLINIC | Age: 28
End: 2023-01-09
Attending: FAMILY MEDICINE
Payer: COMMERCIAL

## 2023-01-09 VITALS
RESPIRATION RATE: 18 BRPM | DIASTOLIC BLOOD PRESSURE: 88 MMHG | SYSTOLIC BLOOD PRESSURE: 126 MMHG | OXYGEN SATURATION: 98 % | HEART RATE: 84 BPM | TEMPERATURE: 97.3 F

## 2023-01-09 DIAGNOSIS — R93.5 ABNORMAL CT OF THE ABDOMEN: ICD-10-CM

## 2023-01-09 DIAGNOSIS — K42.9 UMBILICAL HERNIA WITHOUT OBSTRUCTION AND WITHOUT GANGRENE: ICD-10-CM

## 2023-01-09 DIAGNOSIS — E28.2 POLYCYSTIC OVARIES: Primary | ICD-10-CM

## 2023-01-09 PROCEDURE — 76856 US EXAM PELVIC COMPLETE: CPT | Performed by: RADIOLOGY

## 2023-01-09 PROCEDURE — 99214 OFFICE O/P EST MOD 30 MIN: CPT | Performed by: STUDENT IN AN ORGANIZED HEALTH CARE EDUCATION/TRAINING PROGRAM

## 2023-01-09 PROCEDURE — 76830 TRANSVAGINAL US NON-OB: CPT | Performed by: RADIOLOGY

## 2023-01-09 NOTE — PROGRESS NOTES
Assessment & Plan     Polycystic ovaries  Bulky ovaries seen on CT and US concerning for polycystic ovarian disease. Recommend follow up with OB/GYN for further testing and evaluation for polycystic ovarian syndrome.     Umbilical hernia without obstruction and without gangrene  This is a little tender on exam. We discussed that this is a fat-containing hernia and generally no treatment needed unless it becomes very bothersome and painful. Discussed weight loss and exercise as a way to help reduce tension on the hernia.   - Adult General Surg Referral; Future    Abnormal CT of the abdomen   - US Pelvic Complete with Transvaginal        30 minutes spent on the date of the encounter doing chart review, review of test results, patient visit and documentation            No follow-ups on file.    Whit Escobedo, NATAL[facility]    Maida Paez is a 27 year old, presenting for the following health issues:  Abdominal Pain      HPI Patient is here today to follow up from visit on 1/6/23 for abdominal pain. She had a CT that showed no acute concerns but did note bulky ovaries. They didn't have time for US on 1/6 so she was referred back for pelvic US today. The pain is in the periumbilical region and lower abdomen. She has a fat containing umbilical hernia. She has been treating her symptoms with Advil and she states it is only slightly effective.       Patient Active Problem List   Diagnosis     Morbid obesity (H)     BMI 40.0-44.9, adult (H)     Elevated blood pressure reading     Family history of hypertension     Pre-diabetes     Current Outpatient Medications   Medication     amLODIPine (NORVASC) 5 MG tablet     No current facility-administered medications for this visit.         Review of Systems   Constitutional, HEENT, cardiovascular, pulmonary, GI, , musculoskeletal, neuro, skin, endocrine and psych systems are negative, except as otherwise noted.      Objective    BP  126/88 (BP Location: Right arm, Patient Position: Chair, Cuff Size: Adult Large)   Pulse 84   Temp 97.3  F (36.3  C) (Oral)   Resp 18   SpO2 98%   There is no height or weight on file to calculate BMI.  Physical Exam   GENERAL: alert and no distress  EYES: Eyes grossly normal to inspection, PERRL and conjunctivae and sclerae normal  RESP: lungs clear to auscultation - no rales, rhonchi or wheezes  CV: regular rate and rhythm, normal S1 S2, no S3 or S4, no murmur, click or rub, no peripheral edema  ABDOMEN: tenderness RLQ, umbilical and lower midline  MS: no gross musculoskeletal defects noted, no edema  SKIN: no suspicious lesions or rashes  NEURO: Normal strength and tone, mentation intact and speech normal  PSYCH: mentation appears normal, affect normal/bright    Results for orders placed or performed in visit on 23   US Pelvic Complete with Transvaginal     Status: None    Narrative    EXAMINATION: US PELVIC TRANSABDOMINAL AND TRANSVAGINAL 2023 3:06  PM      CLINICAL HISTORY: History of  section. LMP 2022.  Abnormal CT of the abdomen    COMPARISON: CT 2023    PROCEDURE COMMENT: Both transabdominal and transvaginal imaging  performed of the pelvis with color Doppler.    FINDINGS:  The uterus measures  4.5 cm x 9.1 cm x 5.7 cm. No focal myometrial  mass.     The endometrial stripe measures 8 mm.     The right ovary measures 2.5 cm x 3.9 cm x 3.3 cm. The left ovary  measures 2.1 cm x 4.0 cm x 2.7 cm. Both ovaries have numerous  follicles.     There is no simple free fluid in the pelvis.  No adnexal mass.      Impression    IMPRESSION:  1. Normal pelvic ultrasound.    SAEID SAMPSON MD         SYSTEM ID:  [id number]

## 2023-01-10 NOTE — TELEPHONE ENCOUNTER
REFERRAL INFORMATION:    Referring Provider:  Whit Escobedo    Referring Clinic:  [facility]    Reason for Visit/Diagnosis: hernia       FUTURE VISIT INFORMATION:    Appointment Date: 1.16.23    Appointment Time: 12:30 PM      NOTES RECORD STATUS  DETAILS   OFFICE NOTE from Referring Provider Internal 1.9.23 Whit Escobedo, [facility]   OFFICE NOTE from Other Specialists Internal 1.6.23 Alexander [facility] DISCHARGE SUMMARY/ ED VISITS      OPERATIVE REPORT     ENDOSCOPY (EGD)      PERTINENT LABS Internal    PATHOLOGY REPORTS (RELATED)     IMAGING (CT, MRI, US, XR)  Internal 1.6.23 CT ab pelvis

## 2023-01-16 ENCOUNTER — PRE VISIT (OUTPATIENT)
Dept: SURGERY | Facility: CLINIC | Age: 28
End: 2023-01-16

## 2023-01-16 ENCOUNTER — OFFICE VISIT (OUTPATIENT)
Dept: SURGERY | Facility: CLINIC | Age: 28
End: 2023-01-16
Attending: STUDENT IN AN ORGANIZED HEALTH CARE EDUCATION/TRAINING PROGRAM
Payer: COMMERCIAL

## 2023-01-16 VITALS
HEART RATE: 77 BPM | BODY MASS INDEX: 44.84 KG/M2 | SYSTOLIC BLOOD PRESSURE: 146 MMHG | DIASTOLIC BLOOD PRESSURE: 92 MMHG | WEIGHT: 222.4 LBS | OXYGEN SATURATION: 99 % | HEIGHT: 59 IN

## 2023-01-16 DIAGNOSIS — K42.9 UMBILICAL HERNIA WITHOUT OBSTRUCTION AND WITHOUT GANGRENE: ICD-10-CM

## 2023-01-16 PROBLEM — R73.03 PRE-DIABETES: Status: ACTIVE | Noted: 2021-05-13

## 2023-01-16 PROBLEM — Z82.49 FAMILY HISTORY OF HYPERTENSION: Status: ACTIVE | Noted: 2021-05-17

## 2023-01-16 PROBLEM — R03.0 ELEVATED BLOOD PRESSURE READING: Status: ACTIVE | Noted: 2021-05-17

## 2023-01-16 PROCEDURE — 99203 OFFICE O/P NEW LOW 30 MIN: CPT | Performed by: SURGERY

## 2023-01-16 ASSESSMENT — ENCOUNTER SYMPTOMS
VOMITING: 0
HOARSE VOICE: 0
SORE THROAT: 0
SINUS PAIN: 0
TROUBLE SWALLOWING: 0
BLOOD IN STOOL: 0
HEARTBURN: 0
BOWEL INCONTINENCE: 0
JAUNDICE: 0
CONSTIPATION: 0
RECTAL PAIN: 0
BLOATING: 1
SINUS CONGESTION: 1
NAUSEA: 0
TASTE DISTURBANCE: 0
ABDOMINAL PAIN: 1
NECK MASS: 0
DIARRHEA: 0
SMELL DISTURBANCE: 0

## 2023-01-16 ASSESSMENT — PATIENT HEALTH QUESTIONNAIRE - PHQ9
10. IF YOU CHECKED OFF ANY PROBLEMS, HOW DIFFICULT HAVE THESE PROBLEMS MADE IT FOR YOU TO DO YOUR WORK, TAKE CARE OF THINGS AT HOME, OR GET ALONG WITH OTHER PEOPLE: NOT DIFFICULT AT ALL
SUM OF ALL RESPONSES TO PHQ QUESTIONS 1-9: 8
SUM OF ALL RESPONSES TO PHQ QUESTIONS 1-9: 8

## 2023-01-16 ASSESSMENT — PAIN SCALES - GENERAL: PAINLEVEL: MODERATE PAIN (4)

## 2023-01-16 NOTE — PROGRESS NOTES
"Jeannine Oakley is a 27 year old female with a 1 month history of abdominal pain that is improved. During work up found to have small umbilical hernia. Patient does not feel mass.     Onset did not occur with lifting.  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    Past Medical History:   Diagnosis Date     Murmur, heart     as an infant     No past surgical history on file.    ROS: 10 point review of systems negative except noted in HPI  PHYSICAL EXAM  General appearance- healthy, alert, and in no distress.  Skin- Skin color and turgor normal.  No obvious rashes.  Neck- Neck is supple without obvious adenopathy.  Lungs- Respiratory effort unlabored.  Gait- Normal.  BMI 45  Abdomen - soft non distended, non tender without obvious masses. Umbilical hernia not appreciated.    CT done 1/6/23 reviewed today, very small umbilical hernia.    Impression: Small umbilical hernia in patient with BMI 45.  Risks of surgery far out weight benefits.  Recommend: to PCP for weight management referral, goal 20% weight loss to 178 lbs.  Needs to maintain for 6 months at goal weight.  (Weight loss may alleviate pain symptoms making surgery optional given small size.)  \"Total time = 30 minutes, spent on the date of encounter doing chart review, history and physical, documentation, patient education, and any further activity as noted above.           "

## 2023-01-16 NOTE — LETTER
"1/16/2023       RE: Jeannine Oakley  [address]  [place] [address]     Dear Colleague,    Thank you for referring your patient, Jeannine Oakley, to the [facility] [place] [facility]. Please see a copy of my visit note below.    Jeannine Oakley is a 27 year old female with a 1 month history of abdominal pain that is improved. During work up found to have small umbilical hernia. Patient does not feel mass.     Onset did not occur with lifting.  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    Past Medical History:   Diagnosis Date     Murmur, heart     as an infant     No past surgical history on file.    ROS: 10 point review of systems negative except noted in HPI  PHYSICAL EXAM  General appearance- healthy, alert, and in no distress.  Skin- Skin color and turgor normal.  No obvious rashes.  Neck- Neck is supple without obvious adenopathy.  Lungs- Respiratory effort unlabored.  Gait- Normal.  BMI 45  Abdomen - soft non distended, non tender without obvious masses. Umbilical hernia not appreciated.    CT done 1/6/23 reviewed today, very small umbilical hernia.    Impression: Small umbilical hernia in patient with BMI 45.  Risks of surgery far out weight benefits.  Recommend: to PCP for weight management referral, goal 20% weight loss to 178 lbs.  Needs to maintain for 6 months at goal weight.  (Weight loss may alleviate pain symptoms making surgery optional given small size.)  \"Total time = 30 minutes, spent on the date of encounter doing chart review, history and physical, documentation, patient education, and any further activity as noted above.             Again, " thank you for allowing me to participate in the care of your patient.      Sincerely,    Akbar Sanon MD

## 2023-01-16 NOTE — NURSING NOTE
"Chief Complaint   Patient presents with     New Patient     Umbilical hernia       Vitals:    01/16/23 1248   BP: (!) 146/92   BP Location: Left arm   Patient Position: Sitting   Cuff Size: Adult Regular   Pulse: 77   SpO2: 99%   Weight: 100.9 kg (222 lb 6.4 oz)   Height: 1.499 m (4' 11\")       Body mass index is 44.92 kg/m .                          Ishaan Mantilla, EMT    "

## 2023-01-16 NOTE — PATIENT INSTRUCTIONS
You met with Dr. Akbar Sanon.      Today's visit instructions:    Dr. Sanon recommends that you lose weight to a goal weight of 178lb (current weight 222lb). Please follow-up with your Primary Care Physician to assist you with weight management. Once you have achieved this goal please call us at the Nurse Advice line listed below to schedule another consult for a weight check and plan for hernia repair.        If you have questions please contact Delphine RN or Brittney RN during regular clinic hours, Monday through Friday 7:30 AM - 4:00 PM, or you can contact us via [employer] at anytime.       If you have urgent needs after-hours, weekends, or holidays please call the hospital at [phone] and ask to speak with our on-call General Surgery Team.    Appointment [phone]  Nurse Advice (Delphine or Brittney): [phone]   Surgery Scheduler (Kathya): [phone]  Fax: [phone]

## 2023-05-26 ENCOUNTER — OFFICE VISIT (OUTPATIENT)
Dept: URGENT CARE | Facility: URGENT CARE | Age: 28
End: 2023-05-26
Payer: COMMERCIAL

## 2023-05-26 VITALS
DIASTOLIC BLOOD PRESSURE: 89 MMHG | TEMPERATURE: 98.1 F | BODY MASS INDEX: 44.03 KG/M2 | SYSTOLIC BLOOD PRESSURE: 138 MMHG | HEART RATE: 89 BPM | WEIGHT: 218 LBS

## 2023-05-26 DIAGNOSIS — L24.4 IRRITANT CONTACT DERMATITIS DUE TO DRUG IN CONTACT WITH SKIN: ICD-10-CM

## 2023-05-26 DIAGNOSIS — R21 RASH OF NECK: Primary | ICD-10-CM

## 2023-05-26 PROCEDURE — 99213 OFFICE O/P EST LOW 20 MIN: CPT | Performed by: PHYSICIAN ASSISTANT

## 2023-05-26 RX ORDER — ADAPALENE GEL USP, 0.3% 3 MG/G
GEL TOPICAL
COMMUNITY
Start: 2023-04-19

## 2023-05-26 RX ORDER — CLINDAMYCIN PHOSPHATE 10 UG/ML
LOTION TOPICAL
COMMUNITY
Start: 2023-04-13

## 2023-05-26 NOTE — PROGRESS NOTES
Chief Complaint   Patient presents with     Rash     Dry itchy rash on left side of neck . Reports new medications from derm.              ASSESSMENT:    ICD-10-CM    1. Rash of neck  R21       2. Irritant contact dermatitis due to drug in contact with skin  L24.4             PLAN: Sensitivity to topical clindamycin and topical Differin.  Discontinue for at  least 2 days.  If rash is better then can restart but use them every other day.  Try to avoid neck.  Wash hands after applying to face.  Benadryl 50 mg over-the-counter at bedtime for itching for the next 5 nights.  On neck she can use topical Eucerin ointment, Aquaphor ointment or Vaseline.  Reassurance that this is not shingles.  See today's orders.  Follow-up with primary clinic if not improving.  Advised about symptoms which might herald more serious problems.        Jacque Ferguson PA-C         SUBJECTIVE:  27-year-old female presents for left greater than right neck rash/itching.  Very dry skin in this area.  Started topical Differin and topical clindamycin in mid April for acne.  Tries not to get it on her neck but feels likely it does get on her neck as sometimes she feels it in her eyes too.  Intense itching.  Worried about shingles.  Has Vaseline on it currently.           No Known Allergies    Past Medical History:   Diagnosis Date     Murmur, heart     as an infant       adapalene (DIFFERIN) 0.3 % external gel, Apply topically to affected area(s) at bedtime.  amLODIPine (NORVASC) 5 MG tablet, Take 5 mg by mouth  clindamycin (CLEOCIN T) 1 % external lotion, Apply topically to face every morning*    No current facility-administered medications on file prior to visit.      Social History     Tobacco Use     Smoking status: Never     Smokeless tobacco: Never   Substance Use Topics     Alcohol use: No     Alcohol/week: 0.0 standard drinks of alcohol     Drug use: No       ROS:  General: negative for fever  SKIN: + as above    Physcial Exam:  /89    Pulse 89   Temp 98.1  F (36.7  C) (Tympanic)   Wt 98.9 kg (218 lb)   BMI 44.03 kg/m      GENERAL: alert, no acute distress  EYES: conjunctival clear  RESP: Regular breathing rate  NEURO: awake .  SKIN: Red confluent rash left greater than right neck.  Left neck has tiny 1 mm blister lesions.  No current weeping.  Covered in Vaseline.  Jacque Ferguson PA-C

## 2023-08-27 ENCOUNTER — HEALTH MAINTENANCE LETTER (OUTPATIENT)
Age: 28
End: 2023-08-27

## 2024-08-17 ENCOUNTER — OFFICE VISIT (OUTPATIENT)
Dept: URGENT CARE | Facility: URGENT CARE | Age: 29
End: 2024-08-17
Payer: COMMERCIAL

## 2024-08-17 VITALS
OXYGEN SATURATION: 98 % | HEART RATE: 88 BPM | RESPIRATION RATE: 16 BRPM | SYSTOLIC BLOOD PRESSURE: 132 MMHG | WEIGHT: 212.5 LBS | BODY MASS INDEX: 42.92 KG/M2 | DIASTOLIC BLOOD PRESSURE: 83 MMHG | TEMPERATURE: 98.7 F

## 2024-08-17 DIAGNOSIS — E66.01 CLASS 3 SEVERE OBESITY DUE TO EXCESS CALORIES WITHOUT SERIOUS COMORBIDITY WITH BODY MASS INDEX (BMI) OF 40.0 TO 44.9 IN ADULT (H): ICD-10-CM

## 2024-08-17 DIAGNOSIS — K59.00 CONSTIPATION, UNSPECIFIED CONSTIPATION TYPE: Primary | ICD-10-CM

## 2024-08-17 DIAGNOSIS — E66.813 CLASS 3 SEVERE OBESITY DUE TO EXCESS CALORIES WITHOUT SERIOUS COMORBIDITY WITH BODY MASS INDEX (BMI) OF 40.0 TO 44.9 IN ADULT (H): ICD-10-CM

## 2024-08-17 PROCEDURE — 99213 OFFICE O/P EST LOW 20 MIN: CPT | Performed by: FAMILY MEDICINE

## 2024-08-17 RX ORDER — AZELAIC ACID 0.15 G/G
GEL TOPICAL
COMMUNITY
Start: 2024-05-31

## 2024-08-17 RX ORDER — POLYETHYLENE GLYCOL 3350 17 G/17G
1 POWDER, FOR SOLUTION ORAL
Qty: 510 G | Refills: 0 | Status: SHIPPED | OUTPATIENT
Start: 2024-08-17

## 2024-08-17 ASSESSMENT — PAIN SCALES - GENERAL: PAINLEVEL: NO PAIN (0)

## 2024-08-17 NOTE — PROGRESS NOTES
"  Assessment & Plan     Jeannine was seen today for constipation.    Diagnoses and all orders for this visit:    Constipation, unspecified constipation type  -     Recommended to stick with one laxative at a time: polyethylene glycol (MIRALAX) 17 GM/Dose powder; Take 17 g (1 Capful) by mouth once as needed for constipation. Take daily x 1 week then three time weekly x 1 week then twice a week x 1 week then as needed thereafter. Patient verbalized understanding.  -    Increase fluid intake, fibre and activity level.     Class 3 severe obesity due to excess calories without serious comorbidity with body mass index (BMI) of 40.0 to 44.9 in adult (H)         -   Stopped Phentermine 2 months ago due to side effects         -    Recommended to follow up with PCP to discuss other weight loss treatment options.     Patient education and Handout with home care instructions given. See AVS for details.        Return in about 2 weeks (around 8/31/2024) for follow up with PCP if symptoms fail to improve.      Subjective   Jeannine is a 28 year old, presenting for the following health issues:  Constipation (Patient reports last normal bowel movement without laxatives was two weeks ago. Patient states she is passing a lot of gas but does not have the \"urge\" to go without taking magnesium citrate, Miralax, Duculox or milk of magnesia. Patient states she has increased fiber and water intake as well. Patient was taking Phentermine for weight loss for two months and that is when she began having issues; she stopped taking it two weeks ago.)    HPI       ABDOMINAL PAIN   Onset: 2 months  Description:   Character: Cramping  Location: right lower quadrant- no abdominal pain today.  Radiation: None  Intensity: mild  Progression of Symptoms:  same and intermittent  Accompanying Signs & Symptoms:  Fever/Chills?: no   Gas/Bloating: YES  Nausea: no   Vomitting: no   Diarrhea?: no   Constipation:YES- last BM- 4 days ago   Dysuria or Hematuria: no  " History:   Trauma: no   Previous similar pain: YES- constipation issues   Previous tests done: CT  Precipitating factors:   Does the pain change with:     Food: YES- worse     BM: YES- relieves the discomfort.    Urination: no   Alleviating factors:  nothing  Therapies Tried and outcome: Miralax, Ducolax, Magnesium citrate, enema  LMP:  < 2 weeks ago      GI symptoms started with her weight loss journey while on Phentermine. She discontinued the Phentermine 2 months ago- states that the other symptoms of heart racing stopped but the GI symptoms have persisted.    Body mass index is 42.92 kg/m .    Review of Systems  Constitutional, HEENT, cardiovascular, pulmonary, gi and gu systems are negative, except as otherwise noted.      Objective    /83 (BP Location: Right arm, Patient Position: Sitting, Cuff Size: Adult Large)   Pulse 88   Temp 98.7  F (37.1  C) (Tympanic)   Resp 16   Wt 96.4 kg (212 lb 8 oz)   SpO2 98%   BMI 42.92 kg/m    Body mass index is 42.92 kg/m .  Physical Exam   GENERAL: alert and no distress  RESP: lungs clear to auscultation - no rales, rhonchi or wheezes  CV: regular rate and rhythm, normal S1 S2, no S3 or S4, no murmur, click or rub, no peripheral edema  ABDOMEN: soft, non tender on exam today, no hepatosplenomegaly, no masses and bowel sounds normal.  PSYCH: mentation appears normal, affect normal/bright    DATA  Previous CT abdomen/pelvis results reviewed.          Signed Electronically by: Christin Simmons MD

## 2024-09-09 ENCOUNTER — OFFICE VISIT (OUTPATIENT)
Dept: URGENT CARE | Facility: URGENT CARE | Age: 29
End: 2024-09-09
Payer: COMMERCIAL

## 2024-09-09 VITALS
BODY MASS INDEX: 41.89 KG/M2 | HEART RATE: 110 BPM | DIASTOLIC BLOOD PRESSURE: 87 MMHG | WEIGHT: 207.4 LBS | RESPIRATION RATE: 16 BRPM | SYSTOLIC BLOOD PRESSURE: 136 MMHG | TEMPERATURE: 98.9 F | OXYGEN SATURATION: 100 %

## 2024-09-09 DIAGNOSIS — J03.90 TONSILLITIS: Primary | ICD-10-CM

## 2024-09-09 DIAGNOSIS — Z71.1 CONCERN ABOUT STD IN FEMALE WITHOUT DIAGNOSIS: ICD-10-CM

## 2024-09-09 LAB
CLUE CELLS: ABNORMAL
DEPRECATED S PYO AG THROAT QL EIA: NEGATIVE
GROUP A STREP BY PCR: NOT DETECTED
HCG UR QL: NEGATIVE
TRICHOMONAS, WET PREP: ABNORMAL
WBC'S/HIGH POWER FIELD, WET PREP: ABNORMAL
YEAST, WET PREP: ABNORMAL

## 2024-09-09 PROCEDURE — 87651 STREP A DNA AMP PROBE: CPT | Performed by: EMERGENCY MEDICINE

## 2024-09-09 PROCEDURE — 99213 OFFICE O/P EST LOW 20 MIN: CPT | Performed by: EMERGENCY MEDICINE

## 2024-09-09 PROCEDURE — 87210 SMEAR WET MOUNT SALINE/INK: CPT | Performed by: EMERGENCY MEDICINE

## 2024-09-09 PROCEDURE — 87491 CHLMYD TRACH DNA AMP PROBE: CPT | Performed by: EMERGENCY MEDICINE

## 2024-09-09 PROCEDURE — 87389 HIV-1 AG W/HIV-1&-2 AB AG IA: CPT | Performed by: EMERGENCY MEDICINE

## 2024-09-09 PROCEDURE — 87591 N.GONORRHOEAE DNA AMP PROB: CPT | Performed by: EMERGENCY MEDICINE

## 2024-09-09 PROCEDURE — 86780 TREPONEMA PALLIDUM: CPT | Performed by: EMERGENCY MEDICINE

## 2024-09-09 PROCEDURE — 81025 URINE PREGNANCY TEST: CPT | Performed by: EMERGENCY MEDICINE

## 2024-09-09 PROCEDURE — 36415 COLL VENOUS BLD VENIPUNCTURE: CPT | Performed by: EMERGENCY MEDICINE

## 2024-09-09 NOTE — PROGRESS NOTES
Assessment & Plan     Diagnosis:    ICD-10-CM    1. Tonsillitis  J03.90 Streptococcus A Rapid Screen w/Reflex to PCR - Clinic Collect     Group A Streptococcus PCR Throat Swab     Chlamydia & Gonorrhea by PCR, [facility] - Clinic Collect     Chlamydia & Gonorrhea by PCR, [facility] - Clinic Collect      2. Concern about STD in female without diagnosis  Z71.1 Chlamydia & Gonorrhea by PCR, [facility] - Clinic Collect     Chlamydia & Gonorrhea by PCR, [facility] - Clinic Collect     HIV Antigen Antibody Combo     Treponema Abs w Reflex to RPR and Titer     Wet prep - Clinic Collect     HCG qualitative urine     HIV Antigen Antibody Combo     Treponema Abs w Reflex to RPR and Titer     HCG qualitative urine          Medical Decision Making:  Jeannine Oakley is a 28 year old female presented with sore throat and clinical evidence of pharyngitis.  The rapid strep test is negative, and formal culture has been set up in the lab. There is no clinical evidence of  peritonsillar abscess, retropharyngeal abscess, epiglottis, or Saul's angina.  The patient's symptoms are consistent with viral tonsillitis. She does have STD concerns but no vaginal symptoms; will swab throat for GC/Chlamydia as well given oral sex history. STD testing is pending and she will be contacted if positive.  I have recommended treatment with analgesics, and we will await formal culture results.  If the culture is positive, a provider will call the patient to initiate anti-microbial therapy. Go to the ER if increasing pain, change in voice, neck pain, vomiting, fever, or shortness of breath. Follow-up with primary physician if not improving in 3-5 days. All questions answered. Patient verbalized understanding and agreement with the plan.}      Myles Roche PA-C  [facility]    Subjective     Jeannine Oakley is a 28 year old female who presents to clinic today for the following health issues:  Chief Complaint    Patient presents with    Pharyngitis     Sore throat since yesterday, noticed white patches in tonsils     STD     Wants full STD panel        HPI  Patient reports sore throat since yesterday, some white patches in her tonsils, is concerned for tonsillitis.  She  notes some pain with swallowing, but can still get food and water down.  No fevers, body aches, cough. She is also concerned about STDs and would like some testing done, has had oral sex and vaginal sex recently.  No vaginal bleeding or discharge, abdominal pain, dysuria, hematuria.    Review of Systems    See HPI    Objective      Vitals: /87 (BP Location: Left arm, Patient Position: Sitting, Cuff Size: Adult Regular)   Pulse 110   Temp 98.9  F (37.2  C) (Tympanic)   Resp 16   Wt 94.1 kg (207 lb 6.4 oz)   LMP 09/01/2024 (Exact Date)   SpO2 100%   BMI 41.89 kg/m        Vital signs reviewed by: Myles Roche PA-C    Physical Exam   Constitutional: Alert and active. No acute distress.  Non-toxic appearing.  HENT:   Right Ear: External ear normal. TM: gray/pale appearing  Left Ear: External ear normal. TM: gray/pale appearing.  Nose: Nose normal.    Eyes: Conjunctivae, EOM and lids are normal.   Mouth: Mucous membranes are moist. Posterior oropharynx is erythematous. Exudates present. Tonsils are symmetrically enlarged. Uvula is midline. No submandibular swelling or erythema.  Neck: Normal ROM. No meningismus.   Cardiovascular: Regular rate and rhythm  Pulmonary/Chest: Effort normal. No respiratory distress. Lungs are clear to auscultation throughout.  GI: Abdomen is soft and non-tender throughout. No hepatosplenomegaly.  Skin: No rash noted on visualized skin.       Labs/Imaging:  Results for orders placed or performed in visit on 09/09/24   Streptococcus A Rapid Screen w/Reflex to PCR - Clinic Collect     Status: Normal    Specimen: Throat; Swab   Result Value Ref Range    Group A Strep antigen Negative Negative       Myles Roche PA-C,  September 9, 2024

## 2024-09-10 LAB
C TRACH DNA SPEC QL PROBE+SIG AMP: NEGATIVE
C TRACH DNA SPEC QL PROBE+SIG AMP: NEGATIVE
HIV 1+2 AB+HIV1 P24 AG SERPL QL IA: NONREACTIVE
N GONORRHOEA DNA SPEC QL NAA+PROBE: NEGATIVE
N GONORRHOEA DNA SPEC QL NAA+PROBE: NEGATIVE
T PALLIDUM AB SER QL: NONREACTIVE

## 2024-09-12 ENCOUNTER — OFFICE VISIT (OUTPATIENT)
Dept: URGENT CARE | Facility: URGENT CARE | Age: 29
End: 2024-09-12
Payer: COMMERCIAL

## 2024-09-12 VITALS
HEART RATE: 98 BPM | RESPIRATION RATE: 18 BRPM | SYSTOLIC BLOOD PRESSURE: 134 MMHG | WEIGHT: 206 LBS | BODY MASS INDEX: 41.61 KG/M2 | DIASTOLIC BLOOD PRESSURE: 83 MMHG | TEMPERATURE: 98.3 F | OXYGEN SATURATION: 100 %

## 2024-09-12 DIAGNOSIS — N30.00 ACUTE CYSTITIS WITHOUT HEMATURIA: Primary | ICD-10-CM

## 2024-09-12 DIAGNOSIS — R30.0 DYSURIA: ICD-10-CM

## 2024-09-12 LAB
ALBUMIN UR-MCNC: ABNORMAL MG/DL
APPEARANCE UR: ABNORMAL
BACTERIA #/AREA URNS HPF: ABNORMAL /HPF
BILIRUB UR QL STRIP: NEGATIVE
COLOR UR AUTO: YELLOW
GLUCOSE UR STRIP-MCNC: NEGATIVE MG/DL
HGB UR QL STRIP: ABNORMAL
KETONES UR STRIP-MCNC: 15 MG/DL
LEUKOCYTE ESTERASE UR QL STRIP: ABNORMAL
NITRATE UR QL: NEGATIVE
PH UR STRIP: 6.5 [PH] (ref 5–7)
RBC #/AREA URNS AUTO: ABNORMAL /HPF
SP GR UR STRIP: 1.02 (ref 1–1.03)
SQUAMOUS #/AREA URNS AUTO: ABNORMAL /LPF
UROBILINOGEN UR STRIP-ACNC: 1 E.U./DL
WBC #/AREA URNS AUTO: ABNORMAL /HPF

## 2024-09-12 PROCEDURE — 99213 OFFICE O/P EST LOW 20 MIN: CPT

## 2024-09-12 PROCEDURE — 81001 URINALYSIS AUTO W/SCOPE: CPT

## 2024-09-12 RX ORDER — CEPHALEXIN 500 MG/1
500 CAPSULE ORAL 2 TIMES DAILY
Qty: 14 CAPSULE | Refills: 0 | Status: SHIPPED | OUTPATIENT
Start: 2024-09-12 | End: 2024-09-19

## 2024-09-12 ASSESSMENT — PAIN SCALES - GENERAL: PAINLEVEL: MODERATE PAIN (4)

## 2024-09-12 NOTE — PATIENT INSTRUCTIONS
Urine test shows a urinary tract infection.  Take the antibiotic as prescribed and finish the full course even if symptoms improve.  Try yogurt with active cultures or probiotics such as Culturelle daily to help prevent diarrhea while using antibiotics.  You can also try over the counter Azo and/or cranberry supplements for your urinary symptoms.

## 2024-09-12 NOTE — PROGRESS NOTES
ASSESSMENT:   (N30.00) Acute cystitis without hematuria  (primary encounter diagnosis)  Plan: cephALEXin (KEFLEX) 500 MG capsule    (R30.0) Dysuria  Plan: UA Macroscopic with reflex to Microscopic and         Culture - Lab Collect, UA Microscopic with         Reflex to Culture    PLAN:  Informed the patient that the urine test shows a urinary tract infection.  Urinary tract infection patient instructions discussed and provided.  Informed the patient to take the antibiotic as prescribed and finish the full course even if symptoms improve.  We discussed trying yogurt with active cultures or probiotic such as Culturelle daily to help and diarrhea while taking the antibiotic.  We also discussed trying over-the-counter Azo and/or cranberry supplements for the urinary symptoms.  Informed the patient to return to clinic with any new or worsening symptoms.  Patient acknowledged their understanding of the above plan.    The use of [employer] services may have been used to construct the content in this note; any grammatical or spelling errors are non-intentional. Please contact the author of this note directly if you are in need of any clarification.      Luis Epstein, APRN CNP     SUBJECTIVE:  Jeannine Oakley is a 28 year old female who  presents today for a possible UTI. Symptoms of burning and suprapubic pain and pressure have been going on for 2day(s).  Hematuria no.  gradual onsetand mild.  There is no history of fever, chills, nausea or vomiting.  This patient does not have a history of urinary tract infections. Patient denies vaginal symptoms, pregnancy or STD concerns.    Urine pregnancy test negative this past Monday.  STD testing negative this past Monday as well.     ROS:   Negative except noted above.    OBJECTIVE:  /83 (BP Location: Left arm, Patient Position: Sitting, Cuff Size: Adult Large)   Pulse 98   Temp 98.3  F (36.8  C) (Tympanic)   Resp 18   Wt 93.4 kg (206 lb)    LMP 09/01/2024 (Exact Date)   SpO2 100%   BMI 41.61 kg/m    GENERAL APPEARANCE: healthy, alert and no distress  SKIN: no suspicious lesions or rashes    UA: positive for protein, positive for leukocytes, positive for ketones, and positive for bacturia

## 2024-10-20 ENCOUNTER — HEALTH MAINTENANCE LETTER (OUTPATIENT)
Age: 29
End: 2024-10-20